# Patient Record
Sex: MALE | Race: WHITE | NOT HISPANIC OR LATINO | Employment: FULL TIME | URBAN - METROPOLITAN AREA
[De-identification: names, ages, dates, MRNs, and addresses within clinical notes are randomized per-mention and may not be internally consistent; named-entity substitution may affect disease eponyms.]

---

## 2017-07-12 ENCOUNTER — OFFICE VISIT (OUTPATIENT)
Dept: INTERNAL MEDICINE | Facility: CLINIC | Age: 22
End: 2017-07-12
Payer: COMMERCIAL

## 2017-07-12 VITALS
HEIGHT: 68 IN | TEMPERATURE: 97 F | WEIGHT: 163.56 LBS | HEART RATE: 88 BPM | SYSTOLIC BLOOD PRESSURE: 130 MMHG | OXYGEN SATURATION: 99 % | BODY MASS INDEX: 24.79 KG/M2 | DIASTOLIC BLOOD PRESSURE: 88 MMHG

## 2017-07-12 DIAGNOSIS — F90.2 ATTENTION DEFICIT HYPERACTIVITY DISORDER, COMBINED TYPE: Primary | Chronic | ICD-10-CM

## 2017-07-12 PROCEDURE — 99203 OFFICE O/P NEW LOW 30 MIN: CPT | Mod: S$GLB,,, | Performed by: FAMILY MEDICINE

## 2017-07-12 PROCEDURE — 99999 PR PBB SHADOW E&M-NEW PATIENT-LVL III: CPT | Mod: PBBFAC,,, | Performed by: FAMILY MEDICINE

## 2017-07-12 RX ORDER — DEXTROAMPHETAMINE SACCHARATE, AMPHETAMINE ASPARTATE, DEXTROAMPHETAMINE SULFATE AND AMPHETAMINE SULFATE 5; 5; 5; 5 MG/1; MG/1; MG/1; MG/1
1 TABLET ORAL 2 TIMES DAILY
Qty: 60 TABLET | Refills: 0 | Status: SHIPPED | OUTPATIENT
Start: 2017-07-12 | End: 2017-08-11

## 2017-07-12 RX ORDER — DEXTROAMPHETAMINE SACCHARATE, AMPHETAMINE ASPARTATE, DEXTROAMPHETAMINE SULFATE AND AMPHETAMINE SULFATE 5; 5; 5; 5 MG/1; MG/1; MG/1; MG/1
1 TABLET ORAL DAILY
COMMUNITY
Start: 2013-11-01 | End: 2017-07-12 | Stop reason: SDUPTHER

## 2017-07-12 RX ORDER — DEXTROAMPHETAMINE SACCHARATE, AMPHETAMINE ASPARTATE, DEXTROAMPHETAMINE SULFATE AND AMPHETAMINE SULFATE 5; 5; 5; 5 MG/1; MG/1; MG/1; MG/1
1 TABLET ORAL 2 TIMES DAILY
Qty: 60 TABLET | Refills: 0 | Status: SHIPPED | OUTPATIENT
Start: 2017-08-12 | End: 2017-09-11

## 2017-07-12 RX ORDER — DEXTROAMPHETAMINE SACCHARATE, AMPHETAMINE ASPARTATE, DEXTROAMPHETAMINE SULFATE AND AMPHETAMINE SULFATE 5; 5; 5; 5 MG/1; MG/1; MG/1; MG/1
1 TABLET ORAL 2 TIMES DAILY
Qty: 60 TABLET | Refills: 0 | Status: SHIPPED | OUTPATIENT
Start: 2017-09-12 | End: 2017-10-06 | Stop reason: SDUPTHER

## 2017-07-12 NOTE — ASSESSMENT & PLAN NOTE
He describes the QUALITY of his symptoms as primarily those of the inattention category. He reports his perception of the SEVERITY of his inattention symptoms as MODERATELY SEVERE when he does not take his medication, and MILD to MODERATE when he does take his medication. ASSOCIATED SYMPTOMS include those of the hyperactivity/impulsivity category. He reports his perception of the SEVERITY of his hyperactivity/impulsivity symptoms as MODERATE when he does not take his medication, and MINIMAL to MILD when he does take his medication.  He reports the ONSET of his symptoms was early childhood, having been diagnosed in 1st grade by his pediatritian, and starting treatment in 2nd grade. He reports that EXACERBATING FACTORS include or have included insufficient sleep. He reports that ALLEVIATING FACTORS include or have included prescription stimulant meds for ADHD (Adderall 20mg BID). He reports that these symptoms consistently occur in a variety of settings. 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 He reports no history of schizophrenia, bipolar disorder, neyda or hypomania, or substance abuse. [place] Board of Pharmacy Controlled Prescription Drug Monitoring database was queried and showed data consistent with the history he provided, and it showed no data to suggest abuse, diversion, or other inappropriate use of prescription medications. I called the physician who was treating him throughout undergraduate and up until now (Dr. Sebastián Issa, [phone]), and he confirmed the history provided by Shayne, and he were reported that he was aware of no reason that he should not continue on his current ADHD medication.    STANDARDIZED QUESTIONNAIRE SCORES:  · PHQ-15: 2  · SERENA-7: 0  · PHQ-9: 1  · MDQ: 2  · AUDIT-C: 5 (counseling provided)  · ASRS-v1.1  · Part A: 12  · Part B: 29  · TOTAL: 41

## 2017-07-12 NOTE — PROGRESS NOTES
CHIEF COMPLAINT  Establish Care  discuss ADHD treatment    HISTORY OF PRESENT ILLNESS    Problem List Items Addressed This Visit     Attention deficit hyperactivity disorder, combined type - Primary (Chronic)    Current Assessment & Plan     He describes the QUALITY of his symptoms as primarily those of the inattention category. He reports his perception of the SEVERITY of his inattention symptoms as MODERATELY SEVERE when he does not take his medication, and MILD to MODERATE when he does take his medication. ASSOCIATED SYMPTOMS include those of the hyperactivity/impulsivity category. He reports his perception of the SEVERITY of his hyperactivity/impulsivity symptoms as MODERATE when he does not take his medication, and MINIMAL to MILD when he does take his medication.  He reports the ONSET of his symptoms was early childhood, having been diagnosed in 1st grade by his pediatritian, and starting treatment in 2nd grade. He reports that EXACERBATING FACTORS include or have included insufficient sleep. He reports that ALLEVIATING FACTORS include or have included prescription stimulant meds for ADHD (Adderall 20mg BID). He reports that these symptoms consistently occur in a variety of settings. 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 He reports no history of schizophrenia, bipolar disorder, neyda or hypomania, or substance abuse. [place] Board of Pharmacy Controlled Prescription Drug Monitoring database was queried and showed data consistent with the history he provided, and it showed no data to suggest abuse, diversion, or other inappropriate use of prescription medications. I called the physician who was treating him throughout undergraduate and up until now (Dr. Sebastián Issa, [phone]), and he confirmed the history provided by Shayne, and he were reported that he was aware of no reason that he  should not continue on his current ADHD medication.    STANDARDIZED QUESTIONNAIRE SCORES:  · PHQ-15: 2  · SERENA-7: 0  · PHQ-9: 1  · MDQ: 2  · AUDIT-C: 5 (counseling provided)  · ASRS-v1.1  · Part A: 12  · Part B: 29  · TOTAL: 41         Relevant Medications    dextroamphetamine-amphetamine (ADDERALL) 20 mg tablet    dextroamphetamine-amphetamine (ADDERALL) 20 mg tablet (Start on 8/12/2017)    dextroamphetamine-amphetamine (ADDERALL) 20 mg tablet (Start on 9/12/2017)      Other Visit Diagnoses    None.       TOTAL TIME evaluating and managing this patient for this encounter exceeded 30 minutes, the majority spent counseling and coordinating care for the listed diagnoses.     REVIEW OF SYSTEMS  Answers for HPI/ROS submitted by the patient on 7/10/2017   activity change: No  unexpected weight change: No  neck pain: No  hearing loss: No  rhinorrhea: No  trouble swallowing: No  eye discharge: No  visual disturbance: No  chest tightness: No  wheezing: No  chest pain: No  palpitations: No  blood in stool: No  constipation: No  vomiting: No  diarrhea: No  polydipsia: No  polyuria: No  difficulty urinating: No  hematuria: No  joint swelling: No  arthralgias: No  headaches: No  weakness: No  confusion: No  dysphoric mood: No  (Responses reviewed and clarified/amended as clinically appropriate. -JOHANNE Retana MD)    PHYSICAL EXAM  CONSTITUTIONAL: Vital signs (BP, P, T, RR, et al) noted. No apparent distress. Does not appear acutely ill or septic. Appears adequately hydrated.  HEENT: External ENT grossly unremarkable. Hearing grossly intact. Oropharynx moist.  NECK: Neck supple without meningismus. Trachea midline.  PULM: Lungs clear. Breathing unlabored.  HEART: Auscultation reveals regular rate and rhythm without murmur, gallop or rub. No carotid bruit.  GI: Abdomen soft and nontender. Bowel sounds present.  DERM: Skin warm and moist with normal turgor.   NEURO: Strength is reasonably symmetric without gross focal  "motor deficits or gross deficits of cranial nerve III-XII.  PSYCH: Alert and oriented x 3. Mood is grossly euthymic. Affect appropriate. Judgment and insight not grossly compromised.  MSK: Grossly normal stance and gait.     HEALTH MAINTENANCE - DUE SOON OR OVERDUE  There are no preventive care reminders to display for this patient.     HEALTH MAINTENANCE - COMPLETED  The patient is not eligible for Health Maintenance     CURRENT MEDICATION LIST REVIEWED AND UPDATED  Current Outpatient Prescriptions   Medication Sig    dextroamphetamine-amphetamine (ADDERALL) 20 mg tablet Take 1 tablet by mouth 2 (two) times daily.    [START ON 8/12/2017] dextroamphetamine-amphetamine (ADDERALL) 20 mg tablet Take 1 tablet by mouth 2 (two) times daily.    [START ON 9/12/2017] dextroamphetamine-amphetamine (ADDERALL) 20 mg tablet Take 1 tablet by mouth 2 (two) times daily.     No current facility-administered medications for this visit.        ALLERGY LIST REVIEWED AND UPDATED  Review of patient's allergies indicates:  No Known Allergies    MEDICAL HISTORY REVIEWED AND UPDATED  He has a past medical history of ADHD.    SURGICAL HISTORY REVIEWED AND UPDATED  He has a past surgical history that includes [place] tooth extraction.    SOCIAL HISTORY REVIEWED AND UPDATED  He reports that he has been smoking Cigarettes.  He started smoking about 4 weeks ago. He has been smoking about 0.10 packs per day. He has never used smokeless tobacco. He reports that he drinks alcohol. He reports that he does not use drugs.    ASSESSMENT and PLAN  NOTE: Unless specified otherwise, the PLAN for a listed ASSESSMENT is to continue present treatment as prescribed. The planned follow-up and additional "Patient Instructions" may also be found in the After Visit Summary printed and provided to the patient.    Attention deficit hyperactivity disorder, combined type  -     dextroamphetamine-amphetamine (ADDERALL) 20 mg tablet; Take 1 tablet by mouth 2 (two) " times daily.  Dispense: 60 tablet; Refill: 0  -     dextroamphetamine-amphetamine (ADDERALL) 20 mg tablet; Take 1 tablet by mouth 2 (two) times daily.  Dispense: 60 tablet; Refill: 0  -     dextroamphetamine-amphetamine (ADDERALL) 20 mg tablet; Take 1 tablet by mouth 2 (two) times daily.  Dispense: 60 tablet; Refill: 0      Medication List with Changes/Refills   New Medications    DEXTROAMPHETAMINE-AMPHETAMINE (ADDERALL) 20 MG TABLET    Take 1 tablet by mouth 2 (two) times daily.    DEXTROAMPHETAMINE-AMPHETAMINE (ADDERALL) 20 MG TABLET    Take 1 tablet by mouth 2 (two) times daily.   Changed and/or Refilled Medications    Modified Medication Previous Medication    DEXTROAMPHETAMINE-AMPHETAMINE (ADDERALL) 20 MG TABLET dextroamphetamine-amphetamine (ADDERALL) 20 mg tablet       Take 1 tablet by mouth 2 (two) times daily.    Take 1 tablet by mouth Daily.        Return in about 3 months (around 10/12/2017) for re-evaluate problem(s) discussed today.    ABOUT THIS DOCUMENTATION:  1. The order of the conditions listed in the HPI is one of convenience and does not necessarily reflect the chronology of the appointment, nor the relative importance of a condition. It is possible that additional description or status details about condition(s) may be found elsewhere in the documentation for today's encounter.  2. Documentation entered by me for this encounter was done in part using speech-recognition technology. Although I have made an effort to ensure accuracy, malapropisms may exist and should be interpreted in context.                        SKIP Retana MD

## 2017-10-06 ENCOUNTER — OFFICE VISIT (OUTPATIENT)
Dept: INTERNAL MEDICINE | Facility: CLINIC | Age: 22
End: 2017-10-06
Payer: COMMERCIAL

## 2017-10-06 VITALS
WEIGHT: 165.38 LBS | TEMPERATURE: 97 F | HEIGHT: 68 IN | SYSTOLIC BLOOD PRESSURE: 118 MMHG | HEART RATE: 65 BPM | BODY MASS INDEX: 25.07 KG/M2 | OXYGEN SATURATION: 99 % | DIASTOLIC BLOOD PRESSURE: 82 MMHG

## 2017-10-06 DIAGNOSIS — F90.2 ATTENTION DEFICIT HYPERACTIVITY DISORDER, COMBINED TYPE: Chronic | ICD-10-CM

## 2017-10-06 PROCEDURE — 99999 PR PBB SHADOW E&M-EST. PATIENT-LVL III: CPT | Mod: PBBFAC,,, | Performed by: FAMILY MEDICINE

## 2017-10-06 PROCEDURE — 99214 OFFICE O/P EST MOD 30 MIN: CPT | Mod: S$GLB,,, | Performed by: FAMILY MEDICINE

## 2017-10-06 RX ORDER — DEXTROAMPHETAMINE SACCHARATE, AMPHETAMINE ASPARTATE, DEXTROAMPHETAMINE SULFATE AND AMPHETAMINE SULFATE 5; 5; 5; 5 MG/1; MG/1; MG/1; MG/1
1 TABLET ORAL 2 TIMES DAILY
Qty: 60 TABLET | Refills: 0 | Status: CANCELLED | OUTPATIENT
Start: 2017-11-06 | End: 2017-12-07

## 2017-10-06 RX ORDER — DEXTROAMPHETAMINE SACCHARATE, AMPHETAMINE ASPARTATE, DEXTROAMPHETAMINE SULFATE AND AMPHETAMINE SULFATE 5; 5; 5; 5 MG/1; MG/1; MG/1; MG/1
1 TABLET ORAL
Qty: 90 TABLET | Refills: 0 | Status: SHIPPED | OUTPATIENT
Start: 2017-10-06 | End: 2017-10-31 | Stop reason: SDUPTHER

## 2017-10-06 RX ORDER — DEXTROAMPHETAMINE SACCHARATE, AMPHETAMINE ASPARTATE, DEXTROAMPHETAMINE SULFATE AND AMPHETAMINE SULFATE 5; 5; 5; 5 MG/1; MG/1; MG/1; MG/1
1 TABLET ORAL 2 TIMES DAILY
Qty: 60 TABLET | Refills: 0 | Status: CANCELLED | OUTPATIENT
Start: 2017-12-06 | End: 2018-01-06

## 2017-10-23 NOTE — PROGRESS NOTES
HEALTH MAINTENANCE REVIEW  Health Maintenance   Topic Date Due    Lipid Panel  1993    TETANUS VACCINE  08/02/2011    Pneumococcal PPSV23 (Medium Risk) (1) 08/02/2011    Influenza Vaccine  08/01/2017        HEALTH MAINTENANCE INTERVENTIONS - DUE OR DUE SOON  Health Maintenance Due   Topic Date Due    Lipid Panel  1993    TETANUS VACCINE  08/02/2011    Pneumococcal PPSV23 (Medium Risk) (1) 08/02/2011    Influenza Vaccine  08/01/2017       FUTURE APPOINTMENTS  No future appointments.    CHIEF COMPLAINT  Medication Refill      HISTORY OF PRESENT ILLNESS  PROBLEM/CONDITION: He returns for reevaluation of his attention deficit disorder. Please see his last progress note for additional details about his attention deficit disorder. He is in the PhD math program, and he reports that he is doing satisfactorily academically. However, having been on his current dose of Adderall 20 mg twice daily since his sophomore year of college, over the last year he has found the degree and duration of therapeutic effect waning. We discussed how tolerance for this type of medication develops. I encouraged him to make use of drug holidays whenever possible. We discussed risks and benefits of treatment options. It was agreed to begin a therapeutic trial of a higher dose of Adderall, and to increase the flexibility of his dosing, I am ordering 20 mg, maximum of 3 tablets per day, to be divided at least 4 hours apart between doses and with no dose exceeding 30 mg.    No other complaints or concerns reported.    Problem List Items Addressed This Visit     Attention deficit hyperactivity disorder, combined type (Chronic)    Relevant Medications    dextroamphetamine-amphetamine (ADDERALL) 20 mg tablet          REVIEW OF SYSTEMS  PSYCHIATRIC: No suicidal ideations, mood instability, neyda or hypomania reported.  NEUROLOGIC: No seizures or disordered movement reported.  ENDOCRINE: No polyuria or polydipsia reported.  "    PHYSICAL EXAM  Vitals:    10/06/17 1544   BP: 118/82   BP Location: Right arm   Patient Position: Sitting   BP Method: Medium (Manual)   Pulse: 65   Temp: 97.3 °F (36.3 °C)   TempSrc: Tympanic   SpO2: 99%   Weight: 75 kg (165 lb 5.5 oz)   Height: 5' 8" (1.727 m)     CONSTITUTIONAL: Vital signs noted. No apparent distress. Does not appear acutely ill or septic. Appears adequately hydrated.  PULM: Lungs clear. Breathing unlabored.  CV: Heart regular.  GI: Abdomen soft and nontender.  DERM: Warm and moist.  PSYCHIATRIC: Alert and oriented x 3. Mood is grossly neutral. Affect appropriate. Judgment and insight not grossly compromised.     PAST MEDICAL HISTORY, FAMILY HISTORY, SOCIAL HISTORY, CURRENT MEDICATION LIST, and ALLERGY LIST reviewed by me (JOHANNE Retana MD) and are updated consistent with the patient's report.    ASSESSMENT and PLAN  Attention deficit hyperactivity disorder, combined type  -     dextroamphetamine-amphetamine (ADDERALL) 20 mg tablet; Take 1 tablet by mouth every 4 to 6 hours as needed (for ADD - MAX 3 TABLET PER DAY).  Dispense: 90 tablet; Refill: 0    Other orders  -     Cancel: dextroamphetamine-amphetamine (ADDERALL) 20 mg tablet; Take 1 tablet by mouth 2 (two) times daily.  Dispense: 60 tablet; Refill: 0  -     Cancel: dextroamphetamine-amphetamine (ADDERALL) 20 mg tablet; Take 1 tablet by mouth 2 (two) times daily.  Dispense: 60 tablet; Refill: 0        Medication List with Changes/Refills   Changed and/or Refilled Medications    Modified Medication Previous Medication    DEXTROAMPHETAMINE-AMPHETAMINE (ADDERALL) 20 MG TABLET dextroamphetamine-amphetamine (ADDERALL) 20 mg tablet       Take 1 tablet by mouth every 4 to 6 hours as needed (for ADD - MAX 3 TABLET PER DAY).    Take 1 tablet by mouth 2 (two) times daily.       Return in about 3 months (around 1/6/2018) for re-evaluate problem(s) discussed today.    ABOUT THIS DOCUMENTATION:  · The order of the conditions listed in " "the HPI is one of convenience and does not necessarily reflect the chronology of the appointment, nor the relative importance of a condition. It is possible that additional description or status details about condition(s) may be found elsewhere in the documentation for today's encounter.  · Documentation entered by me for this encounter was done in part using speech-recognition technology. Although I have made an effort to ensure accuracy, "sound like" errors may exist and should be interpreted in context.                        -JOHANNE Retana MD    There are no Patient Instructions on file for this visit.    Answers for HPI/ROS submitted by the patient on 10/2/2017   activity change: No  unexpected weight change: No  neck pain: No  hearing loss: No  rhinorrhea: No  trouble swallowing: No  eye discharge: No  visual disturbance: No  chest tightness: No  wheezing: No  chest pain: No  palpitations: No  blood in stool: No  constipation: No  vomiting: No  diarrhea: No  polydipsia: No  polyuria: No  difficulty urinating: No  urgency: No  hematuria: No  joint swelling: No  arthralgias: No  headaches: No  weakness: No  confusion: No  dysphoric mood: No    "

## 2017-10-26 ENCOUNTER — PATIENT MESSAGE (OUTPATIENT)
Dept: INTERNAL MEDICINE | Facility: CLINIC | Age: 22
End: 2017-10-26

## 2017-10-30 DIAGNOSIS — F90.2 ATTENTION DEFICIT HYPERACTIVITY DISORDER, COMBINED TYPE: Primary | Chronic | ICD-10-CM

## 2017-10-31 RX ORDER — DEXTROAMPHETAMINE SACCHARATE, AMPHETAMINE ASPARTATE, DEXTROAMPHETAMINE SULFATE AND AMPHETAMINE SULFATE 5; 5; 5; 5 MG/1; MG/1; MG/1; MG/1
1 TABLET ORAL
Qty: 90 TABLET | Refills: 0 | Status: CANCELLED | OUTPATIENT
Start: 2017-10-31 | End: 2017-12-01

## 2017-10-31 RX ORDER — DEXTROAMPHETAMINE SACCHARATE, AMPHETAMINE ASPARTATE MONOHYDRATE, DEXTROAMPHETAMINE SULFATE AND AMPHETAMINE SULFATE 5; 5; 5; 5 MG/1; MG/1; MG/1; MG/1
20 CAPSULE, EXTENDED RELEASE ORAL 3 TIMES DAILY
Qty: 90 CAPSULE | Refills: 0 | Status: CANCELLED | OUTPATIENT
Start: 2017-11-30 | End: 2017-12-30

## 2017-10-31 RX ORDER — DEXTROAMPHETAMINE SACCHARATE, AMPHETAMINE ASPARTATE, DEXTROAMPHETAMINE SULFATE AND AMPHETAMINE SULFATE 5; 5; 5; 5 MG/1; MG/1; MG/1; MG/1
1 TABLET ORAL
Qty: 90 TABLET | Refills: 0 | Status: SHIPPED | OUTPATIENT
Start: 2017-10-31 | End: 2017-12-27 | Stop reason: SDUPTHER

## 2017-10-31 RX ORDER — DEXTROAMPHETAMINE SACCHARATE, AMPHETAMINE ASPARTATE, DEXTROAMPHETAMINE SULFATE AND AMPHETAMINE SULFATE 5; 5; 5; 5 MG/1; MG/1; MG/1; MG/1
1 TABLET ORAL
Qty: 90 TABLET | Refills: 0 | Status: SHIPPED | OUTPATIENT
Start: 2017-11-30 | End: 2017-12-27 | Stop reason: SDUPTHER

## 2017-10-31 NOTE — TELEPHONE ENCOUNTER
Shayne Guillermo.    I have received and approved your refill request as follows:  Attention deficit hyperactivity disorder, combined type  -     dextroamphetamine-amphetamine (ADDERALL) 20 mg tablet; Take 1 tablet by mouth every 4 to 6 hours as needed (for ADD - MAX 3 TABLET PER DAY).  Dispense: 90 tablet; Refill: 0  -     dextroamphetamine-amphetamine (ADDERALL) 20 mg tablet; Take 1 tablet by mouth every 4 to 6 hours as needed (for ADD - MAX 3 TABLET PER DAY).  Dispense: 90 tablet; Refill: 0    Other orders  -     Cancel: dextroamphetamine-amphetamine (ADDERALL) 20 mg tablet; Take 1 tablet by mouth every 4 to 6 hours as needed (for ADD - MAX 3 TABLET PER DAY).  Dispense: 90 tablet; Refill: 0  -     Cancel: dextroamphetamine-amphetamine (ADDERALL XR) 20 MG 24 hr capsule; Take 1 capsule (20 mg total) by mouth 3 (three) times daily.  Dispense: 90 capsule; Refill: 0        I sent the prescription electronically to the pharmacy you have listed as your preferred pharmacy:    [facility] [address] [place] [address] & [address] [address]  Phone: [phone] Fax: [phone]      NOTE: If you want to fill the prescription at a different pharmacy, tell your pharmacist to contact the other pharmacy to have the prescription transferred.    **Be sure to come in for re-evaluation before you need any more refills.**    Thanks for letting me care for you.    Kind regards,    JOHANNE Retana M.D.    TIP: It is a good idea to take your current prescription bottles with you to your doctor appointments so wecan check for any other refills you may need.    ----------------------------------------------------------------  UPCOMING APPOINTMENTS  ----------------------------------------------------------------  No future appointments.     ----------------------------------------------------------------  TO SCHEDULE OR CHANGE AN  APPOINTMENT  ----------------------------------------------------------------  *Login to your [facility] account at [email or web address]  *Use the [facility] ramon on your mobile device   or  *Call our appointment desk at [phone].    ----------------------------------------------------------------  ADDITIONAL IMPORTANT INFORMATION  ----------------------------------------------------------------  *[facility] is NOT to be used for urgent needs.  *Although we try to respond to messages within 2 business days, a response can take longer, depending on circumstances.  *For medical emergencies, dial 911.    You can get help with [facility] and [facility] by email at  [email or web address] or by phone at [phone]. The [facility] Patient Support Team is available Monday through Friday from 9 a.m. until 5 p.m.  (After hours, you can leave a message requesting assistance.)

## 2017-12-27 ENCOUNTER — OFFICE VISIT (OUTPATIENT)
Dept: INTERNAL MEDICINE | Facility: CLINIC | Age: 22
End: 2017-12-27
Payer: COMMERCIAL

## 2017-12-27 VITALS
TEMPERATURE: 98 F | SYSTOLIC BLOOD PRESSURE: 124 MMHG | WEIGHT: 171.94 LBS | BODY MASS INDEX: 26.06 KG/M2 | DIASTOLIC BLOOD PRESSURE: 70 MMHG | HEIGHT: 68 IN | RESPIRATION RATE: 18 BRPM | HEART RATE: 92 BPM

## 2017-12-27 DIAGNOSIS — F90.2 ATTENTION DEFICIT HYPERACTIVITY DISORDER, COMBINED TYPE: Chronic | ICD-10-CM

## 2017-12-27 PROCEDURE — 99999 PR PBB SHADOW E&M-EST. PATIENT-LVL III: CPT | Mod: PBBFAC,,, | Performed by: FAMILY MEDICINE

## 2017-12-27 PROCEDURE — 99213 OFFICE O/P EST LOW 20 MIN: CPT | Mod: S$GLB,,, | Performed by: FAMILY MEDICINE

## 2017-12-27 RX ORDER — DEXTROAMPHETAMINE SACCHARATE, AMPHETAMINE ASPARTATE, DEXTROAMPHETAMINE SULFATE AND AMPHETAMINE SULFATE 5; 5; 5; 5 MG/1; MG/1; MG/1; MG/1
1 TABLET ORAL
Qty: 90 TABLET | Refills: 0 | Status: SHIPPED | OUTPATIENT
Start: 2018-02-27 | End: 2018-03-19 | Stop reason: SDUPTHER

## 2017-12-27 RX ORDER — DEXTROAMPHETAMINE SACCHARATE, AMPHETAMINE ASPARTATE, DEXTROAMPHETAMINE SULFATE AND AMPHETAMINE SULFATE 5; 5; 5; 5 MG/1; MG/1; MG/1; MG/1
1 TABLET ORAL
Qty: 90 TABLET | Refills: 0 | Status: SHIPPED | OUTPATIENT
Start: 2017-12-27 | End: 2018-03-19 | Stop reason: SDUPTHER

## 2017-12-27 RX ORDER — DEXTROAMPHETAMINE SACCHARATE, AMPHETAMINE ASPARTATE, DEXTROAMPHETAMINE SULFATE AND AMPHETAMINE SULFATE 5; 5; 5; 5 MG/1; MG/1; MG/1; MG/1
1 TABLET ORAL
Qty: 90 TABLET | Refills: 0 | Status: SHIPPED | OUTPATIENT
Start: 2018-01-27 | End: 2018-03-19 | Stop reason: SDUPTHER

## 2018-01-03 NOTE — PROGRESS NOTES
HEALTH MAINTENANCE REVIEW  Health Maintenance   Topic Date Due    Lipid Panel  1993    TETANUS VACCINE  08/02/2011    Pneumococcal PPSV23 (Medium Risk) (1) 08/02/2011    Influenza Vaccine  08/01/2017        HEALTH MAINTENANCE INTERVENTIONS - DUE OR DUE SOON  Health Maintenance Due   Topic Date Due    Lipid Panel  1993    TETANUS VACCINE  08/02/2011    Pneumococcal PPSV23 (Medium Risk) (1) 08/02/2011    Influenza Vaccine  08/01/2017       FUTURE APPOINTMENTS  No future appointments.    CHIEF COMPLAINT  Medication Refill (adderall)      HISTORY OF PRESENT ILLNESS  PROBLEM/CONDITION: Attention deficit disorder appears well-controlled. No mood instability reported. I have observed no behaviors to suggest inappropriate use of prescribed medications. [place] Board of Pharmacy Controlled Prescription Drug Monitoring database was queried and showed no activity to suggest abuse, diversion, or other inappropriate use of prescription medications.    No other complaints or concerns reported.    Problem List Items Addressed This Visit     Attention deficit hyperactivity disorder, combined type (Chronic)    Relevant Medications    dextroamphetamine-amphetamine (ADDERALL) 20 mg tablet    dextroamphetamine-amphetamine (ADDERALL) 20 mg tablet (Start on 1/27/2018)    dextroamphetamine-amphetamine (ADDERALL) 20 mg tablet (Start on 2/27/2018)          REVIEW OF SYSTEMS  PSYCHIATRIC: No mood instability reported.  NEUROLOGIC: No seizures or tics reported.   Answers for HPI/ROS submitted by the patient on 12/27/2017   activity change: No  unexpected weight change: No  neck pain: No  hearing loss: No  rhinorrhea: No  trouble swallowing: No  eye discharge: No  visual disturbance: No  chest tightness: No  wheezing: No  chest pain: No  palpitations: No  blood in stool: No  constipation: No  vomiting: No  diarrhea: No  polydipsia: No  polyuria: No  difficulty urinating: No  urgency: No  hematuria: No  joint swelling:  "No  arthralgias: No  headaches: No  weakness: No  confusion: No  dysphoric mood: No    PHYSICAL EXAM  Vitals:    12/27/17 1523   BP: 124/70   BP Location: Right arm   Patient Position: Sitting   Pulse: 92   Resp: 18   Temp: 97.8 °F (36.6 °C)   TempSrc: Tympanic   Weight: 78 kg (171 lb 15.3 oz)   Height: 5' 8" (1.727 m)     CONST: Vital signs noted. No apparent distress. Appears well.  PULM: Lungs clear. Breathing unlabored.  CV: Heart regular.  DERM: Warm and moist. Normal turgor.  PSYCH: Alert and oriented x 3. Mood is grossly euthymic. Affect appropriate. Judgment and insight not grossly compromised.     PAST MEDICAL HISTORY, FAMILY HISTORY, SOCIAL HISTORY, CURRENT MEDICATION LIST, and ALLERGY LIST reviewed by me (JOHANNE Retana MD) and are updated consistent with the patient's report.    ASSESSMENT and PLAN  Attention deficit hyperactivity disorder, combined type  -     dextroamphetamine-amphetamine (ADDERALL) 20 mg tablet; Take 1 tablet by mouth every 4 to 6 hours as needed (for ADD - MAX 3 TABLET PER DAY).  Dispense: 90 tablet; Refill: 0  -     dextroamphetamine-amphetamine (ADDERALL) 20 mg tablet; Take 1 tablet by mouth every 4 to 6 hours as needed (for ADD - MAX 3 TABLET PER DAY).  Dispense: 90 tablet; Refill: 0  -     dextroamphetamine-amphetamine (ADDERALL) 20 mg tablet; Take 1 tablet by mouth every 4 to 6 hours as needed (for ADD - MAX 3 TABLET PER DAY).  Dispense: 90 tablet; Refill: 0        PRESCRIPTION MEDICATION MANAGEMENT  Medication List with Changes/Refills   New Medications    DEXTROAMPHETAMINE-AMPHETAMINE (ADDERALL) 20 MG TABLET    Take 1 tablet by mouth every 4 to 6 hours as needed (for ADD - MAX 3 TABLET PER DAY).    DEXTROAMPHETAMINE-AMPHETAMINE (ADDERALL) 20 MG TABLET    Take 1 tablet by mouth every 4 to 6 hours as needed (for ADD - MAX 3 TABLET PER DAY).   Changed and/or Refilled Medications    Modified Medication Previous Medication    DEXTROAMPHETAMINE-AMPHETAMINE (ADDERALL) 20 MG " "TABLET dextroamphetamine-amphetamine (ADDERALL) 20 mg tablet       Take 1 tablet by mouth every 4 to 6 hours as needed (for ADD - MAX 3 TABLET PER DAY).    Take 1 tablet by mouth every 4 to 6 hours as needed (for ADD - MAX 3 TABLET PER DAY).   Discontinued Medications    DEXTROAMPHETAMINE-AMPHETAMINE (ADDERALL) 20 MG TABLET    Take 1 tablet by mouth every 4 to 6 hours as needed (for ADD - MAX 3 TABLET PER DAY).       Return in about 3 months (around 3/27/2018) for re-evaluate problem(s) discussed today.    ABOUT THIS DOCUMENTATION:  · The order of the conditions listed in the HPI is one of convenience and does not necessarily reflect the chronology of the appointment, nor the relative importance of a condition. It is possible that additional description or status details about condition(s) may be found elsewhere in the EHR documentation for today's encounter.  · Documentation entered by me for this encounter was done in part using speech-recognition technology. Although I have made an effort to ensure accuracy, "sound like" errors may exist and should be interpreted in context.                        -JOHANNE Retana MD    There are no Patient Instructions on file for this visit.    "

## 2018-03-19 ENCOUNTER — OFFICE VISIT (OUTPATIENT)
Dept: INTERNAL MEDICINE | Facility: CLINIC | Age: 23
End: 2018-03-19
Payer: COMMERCIAL

## 2018-03-19 VITALS
WEIGHT: 175.94 LBS | SYSTOLIC BLOOD PRESSURE: 124 MMHG | HEIGHT: 68 IN | HEART RATE: 88 BPM | BODY MASS INDEX: 26.66 KG/M2 | TEMPERATURE: 97 F | DIASTOLIC BLOOD PRESSURE: 84 MMHG | OXYGEN SATURATION: 99 %

## 2018-03-19 DIAGNOSIS — K21.9 GASTROESOPHAGEAL REFLUX DISEASE WITHOUT ESOPHAGITIS: Primary | Chronic | ICD-10-CM

## 2018-03-19 DIAGNOSIS — F90.2 ATTENTION DEFICIT HYPERACTIVITY DISORDER, COMBINED TYPE: Chronic | ICD-10-CM

## 2018-03-19 PROCEDURE — 99999 PR PBB SHADOW E&M-EST. PATIENT-LVL III: CPT | Mod: PBBFAC,,, | Performed by: FAMILY MEDICINE

## 2018-03-19 PROCEDURE — 3008F BODY MASS INDEX DOCD: CPT | Mod: CPTII,S$GLB,, | Performed by: FAMILY MEDICINE

## 2018-03-19 PROCEDURE — 99213 OFFICE O/P EST LOW 20 MIN: CPT | Mod: S$GLB,,, | Performed by: FAMILY MEDICINE

## 2018-03-19 RX ORDER — DEXTROAMPHETAMINE SACCHARATE, AMPHETAMINE ASPARTATE, DEXTROAMPHETAMINE SULFATE AND AMPHETAMINE SULFATE 5; 5; 5; 5 MG/1; MG/1; MG/1; MG/1
1 TABLET ORAL
Qty: 90 TABLET | Refills: 0 | Status: SHIPPED | OUTPATIENT
Start: 2018-03-19 | End: 2018-06-11 | Stop reason: SDUPTHER

## 2018-03-19 RX ORDER — FAMOTIDINE 40 MG/1
40 TABLET, FILM COATED ORAL DAILY PRN
Qty: 90 TABLET | Refills: 3 | Status: SHIPPED | OUTPATIENT
Start: 2018-03-19 | End: 2019-07-31

## 2018-03-19 NOTE — PATIENT INSTRUCTIONS
"  GERD (Adult)    The esophagus is a tube that carries food from the mouth to the stomach. A valve at the lower end of the esophagus prevents stomach acid from flowing upward. When this valve doesn't work properly, stomach contents may repeatedly flow back up (reflux) into the esophagus. This is called gastroesophageal reflux disease (GERD). GERD can irritate the esophagus. It can cause problems with swallowing or breathing. In severe cases, GERD can cause recurrent pneumonia or other serious problems.  Symptoms of reflux include burning, pressure or sharp pain in the upper abdomen or mid to lower chest. The pain can spread to the neck, back, or shoulder. There may be belching, an acid taste in the back of the throat, chronic cough, or sore throat or hoarseness. GERD symptoms often occur during the day after a big meal. They can also occur at night when lying down.   Home care  Lifestyle changes can help reduce symptoms. If needed, medicines may be prescribed. Symptoms often improve with treatment, but if treatment is stopped, the symptoms often return after a few months. So most persons with GERD will need to continue treatment.  Lifestyle changes  · Limit or avoid fatty, fried, and spicy foods, as well as coffee, chocolate, mint, and foods with high acid content such as tomatoes and citrus fruit and juices (orange, grapefruit, lemon).  · Dont eat large meals, especially at night. Frequent, smaller meals are best. Do not lie down right after eating. And dont eat anything 3 hours before going to bed.  · Avoid drinking alcohol and smoking. As much as possible, stay away from second hand smoke.  · If you are overweight, losing weight will reduce symptoms.   · Avoid wearing tight clothing around your stomach area.  · If your symptoms occur during sleep, use a foam wedge to elevate your upper body (not just your head.) Or, place 4" blocks under the head of your bed.  Medicines  If needed, medicines can help relieve " the symptoms of GERD and prevent damage to the esophagus. Discuss a medicine plan with your healthcare provider. This may include one or more of the following medicines:  · Antacids to help neutralize the normal acids in your stomach.  · Acid blockers (H2 blockers) to decrease acid production.  · Acid inhibitors (PPIs) to decrease acid production in a different way than the blockers. They may work better, but can take a little longer to take effect.  Take an antacid 30-60 minutes after eating and at bedtime, but not at the same time as an acid blocker.  Try not to take medicines such as ibuprofen and aspirin. If you are taking aspirin for your heart or other medical reasons, talk to your healthcare provider about stopping it.  Follow-up care  Follow up with your healthcare provider or as advised by our staff.  When to seek medical advice  Call your healthcare provider if any of the following occur:  · Stomach pain gets worse or moves to the lower right abdomen (appendix area)  · Chest pain appears or gets worse, or spreads to the back, neck, shoulder, or arm  · Frequent vomiting (cant keep down liquids)  · Blood in the stool or vomit (red or black in color)  · Feeling weak or dizzy  · Fever of 100.4ºF (38ºC) or higher, or as directed by your healthcare provider  Date Last Reviewed: 6/23/2015 © 2000-2017 [employer]. [address] [place] [address], [place] [address]. All rights reserved. This information is not intended as a substitute for professional medical care. Always follow your healthcare professional's instructions.        Medicines for GERD  Gastroesophageal reflux disease (GERD) can be treated with medicine. This may be done with a medicine you can buy over the counter. Or it may be done with a medicine that your healthcare provider has to prescribe. In some cases, both types may be used. Your provider will tell you what is best for your symptoms.  Antacids  Antacids work to weaken the acid in your  stomach and can give you quick relief. You can buy many of them with no prescription. Antacids can be high in sodium. This may be a problem if you have high blood pressure. Some antacids also have aluminum. This should be avoided if you have long-term (chronic) kidney disease. So check with your provider first. Take antacids only when you need to, as advised by your provider.  Side effects: Constipation, diarrhea. If you take too much medicine, it can cause calcium to build up.   H-2 blockers  H-2 blockers cause the stomach to make less acid. They are often used both on demand as symptoms occur, and daily to keep symptoms away. Your provider may prescribe them if antacids dont work for you. You can buy some of them over the counter. These come in a lower dosage.  Side effects: Confusion in older adults  Proton-pump inhibitors  These also cause the stomach to make less acid. They reduce stomach acid more than H-2 blockers. They may be used for a short time, or longer to treat certain conditions. You can buy some of them over the counter. Or your provider may prescribe them. They help control GERD symptoms.  Side effects: Belly (abdominal) pain, diarrhea, upset stomach (nausea). Possible other side effects linked to long-term use and high doses.  Prokinetics  These medicines affect the movement of the digestive tract. They may be recommended if your stomach is emptying too slowly. But in most cases they are not recommended for treating GERD.   Side effects: Tiredness, depression, anxiety, problems with physical movement, belly cramps, constipation, diarrhea, a jittery feeling  Medicines to avoid  Dont take aspirin without your providers approval. And dont take a nonsteroidal anti-inflammatory drug (NSAID), such as ibuprofen. These reduce the protective lining of your stomach. This can lead to more GERD symptoms. Check with your provider or pharmacist before taking a new medicine.   Date Last Reviewed: 7/1/2016  ©  3323-8952 [employer]. [address] [place] [address], [place] [address]. All rights reserved. This information is not intended as a substitute for professional medical care. Always follow your healthcare professional's instructions.

## 2018-06-07 NOTE — ASSESSMENT & PLAN NOTE
He reports that his current treatment is providing satisfactory relief of his attention deficit disorder symptoms and helping him compensate for his deficits. He reports that he is tolerating his current treatment well. he reports no mood instability, tics, or disordered sleep, or other apparent adverse effects. He is demonstrating no behaviors to suggest inappropriate use of prescribed medications. [place] [employer]acy Controlled Prescription Drug Monitoring database was queried and showed no activity to suggest abuse, diversion, or other inappropriate use of prescription medications.

## 2018-06-07 NOTE — PROGRESS NOTES
CHIEF COMPLAINT  Medication Refill      HISTORY OF PRESENT ILLNESS    Problem List Items Addressed This Visit        Psychiatric    Attention deficit hyperactivity disorder, combined type (Chronic)    Current Assessment & Plan     He reports that his current treatment is providing satisfactory relief of his attention deficit disorder symptoms and helping him compensate for his deficits. He reports that he is tolerating his current treatment well. he reports no mood instability, tics, or disordered sleep, or other apparent adverse effects. He is demonstrating no behaviors to suggest inappropriate use of prescribed medications. [place] [employer]acy Controlled Prescription Drug Monitoring database was queried and showed no activity to suggest abuse, diversion, or other inappropriate use of prescription medications.          Relevant Medications    dextroamphetamine-amphetamine (ADDERALL) 20 mg tablet    dextroamphetamine-amphetamine (ADDERALL) 20 mg tablet    dextroamphetamine-amphetamine (ADDERALL) 20 mg tablet       GI    Gastroesophageal reflux disease without esophagitis - Primary (Chronic)    Current Assessment & Plan     He reports typical GERD symptoms as follows. QUALITY described as burning dyspepsia. LOCATION reported as epigastric with radiation upwards. SEVERITY described as moderate. EXACERBATING FACTORS include recumbent positioning and certain foods. ALLEVIATING FACTORS include OTC antacids, which provide transient symptom relief. He reports no vomiting, blood in stool, or dark tarry stool.          Relevant Medications    famotidine (PEPCID) 40 MG tablet          PAST MEDICAL HISTORY, FAMILY HISTORY and SOCIAL HISTORY, CURRENT MEDICATION LIST, and ALLERGY LIST reviewed by me (JOHANNE Retana MD) and are updated consistent with the patient's report.    REVIEW OF SYSTEMS  PSYCHIATRIC: No mood instability reported.   GASTROINTESTINAL: No hematemesis or melena reported. No vomiting or diarrhea  "reported.       PHYSICAL EXAM  Vitals:    03/19/18 1423   BP: 124/84   BP Location: Right arm   Patient Position: Sitting   BP Method: Medium (Manual)   Pulse: 88   Temp: 97.4 °F (36.3 °C)   TempSrc: Tympanic   SpO2: 99%   Weight: 79.8 kg (175 lb 14.8 oz)   Height: 5' 8" (1.727 m)     CONSTITUTIONAL: Vital signs noted. No apparent distress. Does not appear acutely ill or septic. Appears adequately hydrated.  EYE: Sclerae anicteric. Lids and conjunctiva unremarkable.  ENT: External ENT unremarkable. Oropharynx moist.  NECK: Trachea midline. Thyroid nontender.  PULM: Lungs clear. Breathing unlabored.  CV: Auscultation reveals regular rate and rhythm without murmur, gallop or rub. No carotid bruit.  GI: Soft and nontender. Bowel sounds normal.  DERM: Skin warm and moist with normal turgor.  NEURO: There are no gross focal motor deficits or gross deficits of cranial nerves III-XII.  PSYCH: Alert and oriented x 3. Mood is grossly neutral. Affect appropriate. Judgment and insight not grossly compromised.  MSK: Grossly normal stance and gait.     ASSESSMENT and PLAN  Gastroesophageal reflux disease without esophagitis  -     famotidine (PEPCID) 40 MG tablet; Take 1 tablet (40 mg total) by mouth daily as needed for Heartburn.  Dispense: 90 tablet; Refill: 3    Attention deficit hyperactivity disorder, combined type  -     dextroamphetamine-amphetamine (ADDERALL) 20 mg tablet; Take 1 tablet by mouth every 4 to 6 hours as needed (for ADD - MAX 3 TABLET PER DAY).  Dispense: 90 tablet; Refill: 0  -     dextroamphetamine-amphetamine (ADDERALL) 20 mg tablet; Take 1 tablet by mouth every 4 to 6 hours as needed (for ADD - MAX 3 TABLET PER DAY).  Dispense: 90 tablet; Refill: 0  -     dextroamphetamine-amphetamine (ADDERALL) 20 mg tablet; Take 1 tablet by mouth every 4 to 6 hours as needed (for ADD - MAX 3 TABLET PER DAY).  Dispense: 90 tablet; Refill: 0        PRESCRIPTION MEDICATION MANAGEMENT  Except as noted below, CURRENT " "MEDICATIONS are to remain unchanged from that listed above.    Medications Ordered This Encounter      dextroamphetamine-amphetamine (ADDERALL) 20 mg tablet          Sig: Take 1 tablet by mouth every 4 to 6 hours as needed (for ADD - MAX 3 TABLET PER DAY).          Dispense:  90 tablet          Refill:  0      dextroamphetamine-amphetamine (ADDERALL) 20 mg tablet          Sig: Take 1 tablet by mouth every 4 to 6 hours as needed (for ADD - MAX 3 TABLET PER DAY).          Dispense:  90 tablet          Refill:  0      dextroamphetamine-amphetamine (ADDERALL) 20 mg tablet          Sig: Take 1 tablet by mouth every 4 to 6 hours as needed (for ADD - MAX 3 TABLET PER DAY).          Dispense:  90 tablet          Refill:  0      famotidine (PEPCID) 40 MG tablet          Sig: Take 1 tablet (40 mg total) by mouth daily as needed for Heartburn.          Dispense:  90 tablet          Refill:  3  Medications Discontinued During This Encounter   Medication Reason    dextroamphetamine-amphetamine (ADDERALL) 20 mg tablet Reorder    dextroamphetamine-amphetamine (ADDERALL) 20 mg tablet Reorder    dextroamphetamine-amphetamine (ADDERALL) 20 mg tablet Reorder       Follow-up for any new complaints or concerns.    ABOUT THIS DOCUMENTATION:  · The order of the conditions listed in the HPI is one of convenience and does not necessarily reflect the chronology of the appointment, nor the relative importance of a condition. It is possible that additional description or status details about condition(s) may be found elsewhere in the EHR documentation for today's encounter.  · Documentation entered by me for this encounter was done in part using speech-recognition technology. Although I have made an effort to ensure accuracy, "sound like" errors may exist and should be interpreted in context.                        -JOHANNE Retana MD    Patient Instructions       GERD (Adult)    The esophagus is a tube that carries food from the mouth " "to the stomach. A valve at the lower end of the esophagus prevents stomach acid from flowing upward. When this valve doesn't work properly, stomach contents may repeatedly flow back up (reflux) into the esophagus. This is called gastroesophageal reflux disease (GERD). GERD can irritate the esophagus. It can cause problems with swallowing or breathing. In severe cases, GERD can cause recurrent pneumonia or other serious problems.  Symptoms of reflux include burning, pressure or sharp pain in the upper abdomen or mid to lower chest. The pain can spread to the neck, back, or shoulder. There may be belching, an acid taste in the back of the throat, chronic cough, or sore throat or hoarseness. GERD symptoms often occur during the day after a big meal. They can also occur at night when lying down.   Home care  Lifestyle changes can help reduce symptoms. If needed, medicines may be prescribed. Symptoms often improve with treatment, but if treatment is stopped, the symptoms often return after a few months. So most persons with GERD will need to continue treatment.  Lifestyle changes  · Limit or avoid fatty, fried, and spicy foods, as well as coffee, chocolate, mint, and foods with high acid content such as tomatoes and citrus fruit and juices (orange, grapefruit, lemon).  · Dont eat large meals, especially at night. Frequent, smaller meals are best. Do not lie down right after eating. And dont eat anything 3 hours before going to bed.  · Avoid drinking alcohol and smoking. As much as possible, stay away from second hand smoke.  · If you are overweight, losing weight will reduce symptoms.   · Avoid wearing tight clothing around your stomach area.  · If your symptoms occur during sleep, use a foam wedge to elevate your upper body (not just your head.) Or, place 4" blocks under the head of your bed.  Medicines  If needed, medicines can help relieve the symptoms of GERD and prevent damage to the esophagus. Discuss a medicine " plan with your healthcare provider. This may include one or more of the following medicines:  · Antacids to help neutralize the normal acids in your stomach.  · Acid blockers (H2 blockers) to decrease acid production.  · Acid inhibitors (PPIs) to decrease acid production in a different way than the blockers. They may work better, but can take a little longer to take effect.  Take an antacid 30-60 minutes after eating and at bedtime, but not at the same time as an acid blocker.  Try not to take medicines such as ibuprofen and aspirin. If you are taking aspirin for your heart or other medical reasons, talk to your healthcare provider about stopping it.  Follow-up care  Follow up with your healthcare provider or as advised by our staff.  When to seek medical advice  Call your healthcare provider if any of the following occur:  · Stomach pain gets worse or moves to the lower right abdomen (appendix area)  · Chest pain appears or gets worse, or spreads to the back, neck, shoulder, or arm  · Frequent vomiting (cant keep down liquids)  · Blood in the stool or vomit (red or black in color)  · Feeling weak or dizzy  · Fever of 100.4ºF (38ºC) or higher, or as directed by your healthcare provider  Date Last Reviewed: 6/23/2015  © 0862-4189 [employer]. [address] [place] [address], [place] [address]. All rights reserved. This information is not intended as a substitute for professional medical care. Always follow your healthcare professional's instructions.        Medicines for GERD  Gastroesophageal reflux disease (GERD) can be treated with medicine. This may be done with a medicine you can buy over the counter. Or it may be done with a medicine that your healthcare provider has to prescribe. In some cases, both types may be used. Your provider will tell you what is best for your symptoms.  Antacids  Antacids work to weaken the acid in your stomach and can give you quick relief. You can buy many of them with no  prescription. Antacids can be high in sodium. This may be a problem if you have high blood pressure. Some antacids also have aluminum. This should be avoided if you have long-term (chronic) kidney disease. So check with your provider first. Take antacids only when you need to, as advised by your provider.  Side effects: Constipation, diarrhea. If you take too much medicine, it can cause calcium to build up.   H-2 blockers  H-2 blockers cause the stomach to make less acid. They are often used both on demand as symptoms occur, and daily to keep symptoms away. Your provider may prescribe them if antacids dont work for you. You can buy some of them over the counter. These come in a lower dosage.  Side effects: Confusion in older adults  Proton-pump inhibitors  These also cause the stomach to make less acid. They reduce stomach acid more than H-2 blockers. They may be used for a short time, or longer to treat certain conditions. You can buy some of them over the counter. Or your provider may prescribe them. They help control GERD symptoms.  Side effects: Belly (abdominal) pain, diarrhea, upset stomach (nausea). Possible other side effects linked to long-term use and high doses.  Prokinetics  These medicines affect the movement of the digestive tract. They may be recommended if your stomach is emptying too slowly. But in most cases they are not recommended for treating GERD.   Side effects: Tiredness, depression, anxiety, problems with physical movement, belly cramps, constipation, diarrhea, a jittery feeling  Medicines to avoid  Dont take aspirin without your providers approval. And dont take a nonsteroidal anti-inflammatory drug (NSAID), such as ibuprofen. These reduce the protective lining of your stomach. This can lead to more GERD symptoms. Check with your provider or pharmacist before taking a new medicine.   Date Last Reviewed: 7/1/2016  © 7471-0486 [employer]. [address] [facility], [place]  [address]. All rights reserved. This information is not intended as a substitute for professional medical care. Always follow your healthcare professional's instructions.

## 2018-06-07 NOTE — ASSESSMENT & PLAN NOTE
He reports typical GERD symptoms as follows. QUALITY described as burning dyspepsia. LOCATION reported as epigastric with radiation upwards. SEVERITY described as moderate. EXACERBATING FACTORS include recumbent positioning and certain foods. ALLEVIATING FACTORS include OTC antacids, which provide transient symptom relief. He reports no vomiting, blood in stool, or dark tarry stool.

## 2018-06-11 ENCOUNTER — OFFICE VISIT (OUTPATIENT)
Dept: INTERNAL MEDICINE | Facility: CLINIC | Age: 23
End: 2018-06-11
Payer: COMMERCIAL

## 2018-06-11 VITALS
OXYGEN SATURATION: 93 % | BODY MASS INDEX: 26.9 KG/M2 | SYSTOLIC BLOOD PRESSURE: 130 MMHG | HEIGHT: 68 IN | DIASTOLIC BLOOD PRESSURE: 78 MMHG | HEART RATE: 93 BPM | WEIGHT: 177.5 LBS | TEMPERATURE: 98 F

## 2018-06-11 DIAGNOSIS — F90.2 ATTENTION DEFICIT HYPERACTIVITY DISORDER, COMBINED TYPE: Chronic | ICD-10-CM

## 2018-06-11 PROCEDURE — 99213 OFFICE O/P EST LOW 20 MIN: CPT | Mod: S$GLB,,, | Performed by: FAMILY MEDICINE

## 2018-06-11 PROCEDURE — 3008F BODY MASS INDEX DOCD: CPT | Mod: CPTII,S$GLB,, | Performed by: FAMILY MEDICINE

## 2018-06-11 PROCEDURE — 99999 PR PBB SHADOW E&M-EST. PATIENT-LVL III: CPT | Mod: PBBFAC,,, | Performed by: FAMILY MEDICINE

## 2018-06-11 RX ORDER — DEXTROAMPHETAMINE SACCHARATE, AMPHETAMINE ASPARTATE, DEXTROAMPHETAMINE SULFATE AND AMPHETAMINE SULFATE 5; 5; 5; 5 MG/1; MG/1; MG/1; MG/1
1 TABLET ORAL
Qty: 90 TABLET | Refills: 0 | Status: SHIPPED | OUTPATIENT
Start: 2018-07-11 | End: 2018-07-06 | Stop reason: SDUPTHER

## 2018-06-11 RX ORDER — DEXTROAMPHETAMINE SACCHARATE, AMPHETAMINE ASPARTATE, DEXTROAMPHETAMINE SULFATE AND AMPHETAMINE SULFATE 5; 5; 5; 5 MG/1; MG/1; MG/1; MG/1
1 TABLET ORAL
Qty: 90 TABLET | Refills: 0 | Status: SHIPPED | OUTPATIENT
Start: 2018-06-11 | End: 2018-08-29 | Stop reason: SDUPTHER

## 2018-06-11 RX ORDER — DEXTROAMPHETAMINE SACCHARATE, AMPHETAMINE ASPARTATE, DEXTROAMPHETAMINE SULFATE AND AMPHETAMINE SULFATE 5; 5; 5; 5 MG/1; MG/1; MG/1; MG/1
1 TABLET ORAL
Qty: 90 TABLET | Refills: 0 | Status: SHIPPED | OUTPATIENT
Start: 2018-08-11 | End: 2018-07-06 | Stop reason: SDUPTHER

## 2018-06-11 NOTE — PROGRESS NOTES
"NOTE: His attention deficit disorder has been stable. I told him that if he continued to do well, he could send me a status update in 2-3 months and request refills, and he would return to see me three months thereafter, assuming his condition remained stable.     CHIEF COMPLAINT  Medication Refill      HISTORY OF PRESENT ILLNESS  He reports that his current treatment is providing satisfactory relief of his attention deficit disorder symptoms and helping him compensate for his deficits at work. He reports that he is tolerating his current treatment well. he reports no mood instability, tics, or disordered sleep, or other apparent adverse effects. He is demonstrating no behaviors to suggest inappropriate use of prescribed medications. [place] Board of Pharmacy Controlled Prescription Drug Monitoring database was queried and showed no activity to suggest abuse, diversion, or other inappropriate use of prescription medications.     Problem List Items Addressed This Visit        Psychiatric    Attention deficit hyperactivity disorder, combined type (Chronic)    Relevant Medications    dextroamphetamine-amphetamine (ADDERALL) 20 mg tablet    dextroamphetamine-amphetamine (ADDERALL) 20 mg tablet (Start on 7/11/2018)    dextroamphetamine-amphetamine (ADDERALL) 20 mg tablet (Start on 8/11/2018)          PAST MEDICAL HISTORY, FAMILY HISTORY and SOCIAL HISTORY, CURRENT MEDICATION LIST, and ALLERGY LIST reviewed by me (JOHANNE Retana MD) and are updated consistent with the patient's report.    REVIEW OF SYSTEMS  PSYCHIATRIC: No mood instability reported.  NEUROLOGIC: No seizures or tics reported.     PHYSICAL EXAM  Vitals:    06/11/18 1609   BP: 130/78   BP Location: Right arm   Patient Position: Sitting   BP Method: Medium (Manual)   Pulse: 93   Temp: 97.9 °F (36.6 °C)   TempSrc: Tympanic   SpO2: (!) 93%   Weight: 80.5 kg (177 lb 7.5 oz)   Height: 5' 8" (1.727 m)     CONST: Vital signs noted. No apparent distress. " Appears well.  PULM: Breathing unlabored.  CV: Heart regular.  DERM: Warm and moist. Normal turgor.  PSYCH: Alert and oriented x 3. Mood is grossly euthymic. Affect appropriate. Judgment and insight not grossly compromised.     ASSESSMENT and PLAN  Attention deficit hyperactivity disorder, combined type  -     dextroamphetamine-amphetamine (ADDERALL) 20 mg tablet; Take 1 tablet by mouth every 4 to 6 hours as needed (for ADD - MAX 3 TABLET PER DAY).  Dispense: 90 tablet; Refill: 0  -     dextroamphetamine-amphetamine (ADDERALL) 20 mg tablet; Take 1 tablet by mouth every 4 to 6 hours as needed (for ADD - MAX 3 TABLET PER DAY).  Dispense: 90 tablet; Refill: 0  -     dextroamphetamine-amphetamine (ADDERALL) 20 mg tablet; Take 1 tablet by mouth every 4 to 6 hours as needed (for ADD - MAX 3 TABLET PER DAY).  Dispense: 90 tablet; Refill: 0        PRESCRIPTION MEDICATION MANAGEMENT  Except as noted below, CURRENT MEDICATIONS are to remain unchanged from that listed above.    Medications Ordered This Encounter      dextroamphetamine-amphetamine (ADDERALL) 20 mg tablet          Sig: Take 1 tablet by mouth every 4 to 6 hours as needed (for ADD - MAX 3 TABLET PER DAY).          Dispense:  90 tablet          Refill:  0      dextroamphetamine-amphetamine (ADDERALL) 20 mg tablet          Sig: Take 1 tablet by mouth every 4 to 6 hours as needed (for ADD - MAX 3 TABLET PER DAY).          Dispense:  90 tablet          Refill:  0      dextroamphetamine-amphetamine (ADDERALL) 20 mg tablet          Sig: Take 1 tablet by mouth every 4 to 6 hours as needed (for ADD - MAX 3 TABLET PER DAY).          Dispense:  90 tablet          Refill:  0  Medications Discontinued During This Encounter   Medication Reason    dextroamphetamine-amphetamine (ADDERALL) 20 mg tablet Reorder    dextroamphetamine-amphetamine (ADDERALL) 20 mg tablet Reorder    dextroamphetamine-amphetamine (ADDERALL) 20 mg tablet Reorder       No Follow-up on file.    ABOUT  "THIS DOCUMENTATION:  · The order of the conditions listed in the HPI is one of convenience and does not necessarily reflect the chronology of the appointment, nor the relative importance of a condition. It is possible that additional description or status details about condition(s) may be found elsewhere in the EHR documentation for today's encounter.  · Documentation entered by me for this encounter was done in part using speech-recognition technology. Although I have made an effort to ensure accuracy, "sound like" errors may exist and should be interpreted in context.                        -JOHANNE Retana MD    There are no Patient Instructions on file for this visit.          "

## 2018-07-05 ENCOUNTER — TELEPHONE (OUTPATIENT)
Dept: INTERNAL MEDICINE | Facility: CLINIC | Age: 23
End: 2018-07-05

## 2018-07-05 NOTE — TELEPHONE ENCOUNTER
----- Message from Evelia Pace sent at 7/5/2018  2:00 PM CDT -----  Contact: self [phone]  States that he needs a new rx for Adderall 20mg 3x daily sent to [id number] in [place]. Please call back at [phone]//thank you acc

## 2018-07-05 NOTE — TELEPHONE ENCOUNTER
Patient states he spoke with the [facility] pharmacy about transffering is Adderall rx , but was told they cannot transfer his presciption to the [facility] because it is a controlled substance

## 2018-07-06 ENCOUNTER — TELEPHONE (OUTPATIENT)
Dept: INTERNAL MEDICINE | Facility: CLINIC | Age: 23
End: 2018-07-06

## 2018-07-06 DIAGNOSIS — F90.2 ATTENTION DEFICIT HYPERACTIVITY DISORDER, COMBINED TYPE: Chronic | ICD-10-CM

## 2018-07-06 RX ORDER — DEXTROAMPHETAMINE SACCHARATE, AMPHETAMINE ASPARTATE, DEXTROAMPHETAMINE SULFATE AND AMPHETAMINE SULFATE 5; 5; 5; 5 MG/1; MG/1; MG/1; MG/1
1 TABLET ORAL
Qty: 90 TABLET | Refills: 0 | Status: SHIPPED | OUTPATIENT
Start: 2018-08-11 | End: 2018-08-29 | Stop reason: SDUPTHER

## 2018-07-06 RX ORDER — DEXTROAMPHETAMINE SACCHARATE, AMPHETAMINE ASPARTATE, DEXTROAMPHETAMINE SULFATE AND AMPHETAMINE SULFATE 5; 5; 5; 5 MG/1; MG/1; MG/1; MG/1
1 TABLET ORAL
Qty: 90 TABLET | Refills: 0 | Status: SHIPPED | OUTPATIENT
Start: 2018-07-11 | End: 2018-08-29 | Stop reason: SDUPTHER

## 2018-07-06 NOTE — TELEPHONE ENCOUNTER
----- Message from Hortencia Lozano sent at 7/6/2018  4:03 PM CDT -----  Contact: patient  Calling concerning the status of RX transfer. Please call patient today ASAP @ [phone]. Thanks, rudy

## 2018-07-06 NOTE — TELEPHONE ENCOUNTER
Notified Delaney, pharmacist, at [facility] ([facility]) to cancel the Adderall eRxs for July and August per Dr. Retana due to transfer. She verbalized understanding.

## 2018-07-06 NOTE — TELEPHONE ENCOUNTER
Patient reported he is still awaiting Adderall eRx transfer to different pharmacy. Confirmed patient's preferred pharmacy, medications, and allergies. Informed patient I would pass request on to Dr. Retana. Patient verbalized understanding and ended call.

## 2018-07-10 NOTE — TELEPHONE ENCOUNTER
He was sent eRx on 7/11/2018 for dextroamphetamine-amphetamine (ADDERALL) 20 mg tablet, a quantity of 90 tablet(s) with 0 additional refills to the pharmacy chosen by the patient, [facility] [address] [place] [address], [place] [address] [address], and the pharmacy confirmed their receipt of the prescription 7/6/2018 at 6:03 PM CDT. He should not need a new Rx now.

## 2018-08-27 ENCOUNTER — PATIENT MESSAGE (OUTPATIENT)
Dept: INTERNAL MEDICINE | Facility: CLINIC | Age: 23
End: 2018-08-27

## 2018-08-27 DIAGNOSIS — F90.2 ATTENTION DEFICIT HYPERACTIVITY DISORDER, COMBINED TYPE: Chronic | ICD-10-CM

## 2018-08-29 RX ORDER — DEXTROAMPHETAMINE SACCHARATE, AMPHETAMINE ASPARTATE, DEXTROAMPHETAMINE SULFATE AND AMPHETAMINE SULFATE 5; 5; 5; 5 MG/1; MG/1; MG/1; MG/1
1 TABLET ORAL
Qty: 90 TABLET | Refills: 0 | Status: SHIPPED | OUTPATIENT
Start: 2018-09-29 | End: 2018-09-26 | Stop reason: SDUPTHER

## 2018-08-29 RX ORDER — DEXTROAMPHETAMINE SACCHARATE, AMPHETAMINE ASPARTATE, DEXTROAMPHETAMINE SULFATE AND AMPHETAMINE SULFATE 5; 5; 5; 5 MG/1; MG/1; MG/1; MG/1
1 TABLET ORAL
Qty: 90 TABLET | Refills: 0 | Status: SHIPPED | OUTPATIENT
Start: 2018-08-29 | End: 2018-09-26 | Stop reason: SDUPTHER

## 2018-08-29 RX ORDER — DEXTROAMPHETAMINE SACCHARATE, AMPHETAMINE ASPARTATE, DEXTROAMPHETAMINE SULFATE AND AMPHETAMINE SULFATE 5; 5; 5; 5 MG/1; MG/1; MG/1; MG/1
1 TABLET ORAL
Qty: 90 TABLET | Refills: 0 | Status: SHIPPED | OUTPATIENT
Start: 2018-10-29 | End: 2018-09-26 | Stop reason: SDUPTHER

## 2018-08-29 NOTE — TELEPHONE ENCOUNTER
He is demonstrating no behaviors to suggest inappropriate use of prescribed medications. [place] Board of Pharmacy Controlled Prescription Drug Monitoring database was queried and showed no activity to suggest abuse, diversion, or other inappropriate use of prescription medications.

## 2018-09-26 ENCOUNTER — TELEPHONE (OUTPATIENT)
Dept: INTERNAL MEDICINE | Facility: CLINIC | Age: 23
End: 2018-09-26

## 2018-09-26 DIAGNOSIS — F90.2 ATTENTION DEFICIT HYPERACTIVITY DISORDER, COMBINED TYPE: Chronic | ICD-10-CM

## 2018-09-26 RX ORDER — DEXTROAMPHETAMINE SACCHARATE, AMPHETAMINE ASPARTATE, DEXTROAMPHETAMINE SULFATE AND AMPHETAMINE SULFATE 5; 5; 5; 5 MG/1; MG/1; MG/1; MG/1
1 TABLET ORAL
Qty: 90 TABLET | Refills: 0 | Status: SHIPPED | OUTPATIENT
Start: 2018-10-29 | End: 2018-12-27

## 2018-09-26 RX ORDER — DEXTROAMPHETAMINE SACCHARATE, AMPHETAMINE ASPARTATE, DEXTROAMPHETAMINE SULFATE AND AMPHETAMINE SULFATE 5; 5; 5; 5 MG/1; MG/1; MG/1; MG/1
1 TABLET ORAL
Qty: 90 TABLET | Refills: 0 | Status: SHIPPED | OUTPATIENT
Start: 2018-09-29 | End: 2018-12-27 | Stop reason: SDUPTHER

## 2018-09-26 NOTE — TELEPHONE ENCOUNTER
Read my patient portal message (below). No further action is required unless you need to notify the patient.  --------------------------------------------------------------------------------     Shayne Guillermo.    I understand that your pharmacy could not find the prescriptions for Adderall that I sent electronically on 8/29/2018. To fix this, I have sent replacement prescriptions to the same pharmacy, as listed below. (You will need to  your medications from that pharmacy, because this type of prescription cannot be transferred from pharmacy to pharmacy.)    I'm sorry you had a problem with this. Let me know if you need anything else from me.    Thanks for letting me care for you.    Sincerely,    JOHANNE Retana MD     Medications Ordered This Encounter   Medications    dextroamphetamine-amphetamine (ADDERALL) 20 mg tablet     Sig: Take 1 tablet by mouth every 4 to 6 hours as needed (for ADD - MAX 3 TABLET PER DAY).     Dispense:  90 tablet     Refill:  0     This prescription may be filled in a timely manner after completion of prior dispensed supply of this medication.     dextroamphetamine-amphetamine (ADDERALL) 20 mg tablet     Sig: Take 1 tablet by mouth every 4 to 6 hours as needed (for ADD - MAX 3 TABLET PER DAY).     Dispense:  90 tablet     Refill:  0     This prescription may be filled in a timely manner after completion of prior dispensed supply of this medication.      SENT TO:   [facility] [id number] [place], [place] [id number], [address] [place] [address] [place] [address]   Phone: [phone] Fax: [phone]

## 2018-09-26 NOTE — TELEPHONE ENCOUNTER
Spoke with pharmacist with [facility] [place] Per pharmacist Rx for adderall dated 9/29/2018 and 10/29/2018 was not received electronically and needs to be resent. Will notify provider for advice.//ddw

## 2018-12-27 ENCOUNTER — OFFICE VISIT (OUTPATIENT)
Dept: INTERNAL MEDICINE | Facility: CLINIC | Age: 23
End: 2018-12-27
Payer: COMMERCIAL

## 2018-12-27 ENCOUNTER — LAB VISIT (OUTPATIENT)
Dept: LAB | Facility: HOSPITAL | Age: 23
End: 2018-12-27
Attending: FAMILY MEDICINE
Payer: COMMERCIAL

## 2018-12-27 VITALS
WEIGHT: 189.81 LBS | HEIGHT: 68 IN | DIASTOLIC BLOOD PRESSURE: 88 MMHG | BODY MASS INDEX: 28.77 KG/M2 | TEMPERATURE: 98 F | HEART RATE: 106 BPM | SYSTOLIC BLOOD PRESSURE: 120 MMHG

## 2018-12-27 DIAGNOSIS — Z00.00 PREVENTATIVE HEALTH CARE: ICD-10-CM

## 2018-12-27 DIAGNOSIS — Z11.3 ROUTINE SCREENING FOR STI (SEXUALLY TRANSMITTED INFECTION): ICD-10-CM

## 2018-12-27 DIAGNOSIS — R03.0 ELEVATED SYSTOLIC BLOOD PRESSURE READING WITHOUT DIAGNOSIS OF HYPERTENSION: ICD-10-CM

## 2018-12-27 DIAGNOSIS — Z13.220 SCREENING FOR LIPID DISORDERS: ICD-10-CM

## 2018-12-27 DIAGNOSIS — Z13.1 SCREENING FOR DIABETES MELLITUS: ICD-10-CM

## 2018-12-27 DIAGNOSIS — Z11.4 SCREENING FOR HIV WITHOUT PRESENCE OF RISK FACTORS: ICD-10-CM

## 2018-12-27 DIAGNOSIS — E66.3 OVERWEIGHT (BMI 25.0-29.9): ICD-10-CM

## 2018-12-27 DIAGNOSIS — Z23 NEEDS FLU SHOT: ICD-10-CM

## 2018-12-27 DIAGNOSIS — F90.2 ATTENTION DEFICIT HYPERACTIVITY DISORDER, COMBINED TYPE: Primary | Chronic | ICD-10-CM

## 2018-12-27 DIAGNOSIS — K21.9 GASTROESOPHAGEAL REFLUX DISEASE WITHOUT ESOPHAGITIS: Chronic | ICD-10-CM

## 2018-12-27 DIAGNOSIS — F17.211 CIGARETTE NICOTINE DEPENDENCE IN REMISSION: ICD-10-CM

## 2018-12-27 LAB
CHOLEST SERPL-MCNC: 213 MG/DL
CHOLEST/HDLC SERPL: 3.7 {RATIO}
GLUCOSE SERPL-MCNC: 89 MG/DL
HDLC SERPL-MCNC: 57 MG/DL
HDLC SERPL: 26.8 %
LDLC SERPL CALC-MCNC: 139.2 MG/DL
NONHDLC SERPL-MCNC: 156 MG/DL
TRIGL SERPL-MCNC: 84 MG/DL

## 2018-12-27 PROCEDURE — 86703 HIV-1/HIV-2 1 RESULT ANTBDY: CPT

## 2018-12-27 PROCEDURE — 90471 IMMUNIZATION ADMIN: CPT | Mod: S$GLB,,, | Performed by: FAMILY MEDICINE

## 2018-12-27 PROCEDURE — 82947 ASSAY GLUCOSE BLOOD QUANT: CPT

## 2018-12-27 PROCEDURE — 86592 SYPHILIS TEST NON-TREP QUAL: CPT

## 2018-12-27 PROCEDURE — 90686 IIV4 VACC NO PRSV 0.5 ML IM: CPT | Mod: S$GLB,,, | Performed by: FAMILY MEDICINE

## 2018-12-27 PROCEDURE — 3008F BODY MASS INDEX DOCD: CPT | Mod: CPTII,S$GLB,, | Performed by: FAMILY MEDICINE

## 2018-12-27 PROCEDURE — 99214 OFFICE O/P EST MOD 30 MIN: CPT | Mod: 25,S$GLB,, | Performed by: FAMILY MEDICINE

## 2018-12-27 PROCEDURE — 36415 COLL VENOUS BLD VENIPUNCTURE: CPT | Mod: PO

## 2018-12-27 PROCEDURE — 99999 PR PBB SHADOW E&M-EST. PATIENT-LVL III: CPT | Mod: PBBFAC,,, | Performed by: FAMILY MEDICINE

## 2018-12-27 PROCEDURE — 80061 LIPID PANEL: CPT

## 2018-12-27 RX ORDER — DEXTROAMPHETAMINE SACCHARATE, AMPHETAMINE ASPARTATE, DEXTROAMPHETAMINE SULFATE AND AMPHETAMINE SULFATE 5; 5; 5; 5 MG/1; MG/1; MG/1; MG/1
1 TABLET ORAL
Qty: 90 TABLET | Refills: 0 | Status: SHIPPED | OUTPATIENT
Start: 2019-02-27 | End: 2019-03-21 | Stop reason: SDUPTHER

## 2018-12-27 RX ORDER — DEXTROAMPHETAMINE SACCHARATE, AMPHETAMINE ASPARTATE, DEXTROAMPHETAMINE SULFATE AND AMPHETAMINE SULFATE 5; 5; 5; 5 MG/1; MG/1; MG/1; MG/1
1 TABLET ORAL
Qty: 90 TABLET | Refills: 0 | Status: SHIPPED | OUTPATIENT
Start: 2019-01-27 | End: 2019-03-21 | Stop reason: SDUPTHER

## 2018-12-27 RX ORDER — DEXTROAMPHETAMINE SACCHARATE, AMPHETAMINE ASPARTATE, DEXTROAMPHETAMINE SULFATE AND AMPHETAMINE SULFATE 5; 5; 5; 5 MG/1; MG/1; MG/1; MG/1
1 TABLET ORAL
Qty: 90 TABLET | Refills: 0 | Status: SHIPPED | OUTPATIENT
Start: 2018-12-27 | End: 2019-03-21 | Stop reason: SDUPTHER

## 2018-12-27 NOTE — ASSESSMENT & PLAN NOTE
He is currently at the maintenance stage of smoking cessation (remaining a non-smoker). Smoking cessation encouraged.

## 2018-12-27 NOTE — ASSESSMENT & PLAN NOTE
He reports that his current treatment is providing satisfactory relief of his attention deficit disorder symptoms and helping him compensate for his deficits at work. 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 He reports that he is tolerating his current treatment well. He reports no mood instability, tics, or disordered sleep, or other apparent adverse effects. He is demonstrating no behaviors to suggest inappropriate use of prescribed medications. [place] Board of Pharmacy Controlled Prescription Drug Monitoring database was queried and showed no activity to suggest abuse, diversion, or other inappropriate use of prescription medications. NOTE: His attention deficit disorder has been stable. I told him that if he continued to do well, he could send me a status update in 2-3 months and request refills, and he would return to see me three months thereafter, assuming his condition remained stable.

## 2018-12-27 NOTE — ASSESSMENT & PLAN NOTE
BP Readings from Last 5 Encounters:   12/27/18 120/88   06/11/18 130/78   03/19/18 124/84   12/27/17 124/70   10/06/17 118/82   Therapeutic lifestyle changes encouraged.

## 2018-12-27 NOTE — PROGRESS NOTES
CHIEF COMPLAINT  Medication Refill      HISTORY OF PRESENT ILLNESS    Problem List Items Addressed This Visit        Psychiatric    Attention deficit hyperactivity disorder, combined type - Primary (Chronic)    Current Assessment & Plan     He reports that his current treatment is providing satisfactory relief of his attention deficit disorder symptoms and helping him compensate for his deficits at work. 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 He reports that he is tolerating his current treatment well. He reports no mood instability, tics, or disordered sleep, or other apparent adverse effects. He is demonstrating no behaviors to suggest inappropriate use of prescribed medications. [place] Board of Pharmacy Controlled Prescription Drug Monitoring database was queried and showed no activity to suggest abuse, diversion, or other inappropriate use of prescription medications. NOTE: His attention deficit disorder has been stable. I told him that if he continued to do well, he could send me a status update in 2-3 months and request refills, and he would return to see me three months thereafter, assuming his condition remained stable.          Relevant Medications    dextroamphetamine-amphetamine (ADDERALL) 20 mg tablet    dextroamphetamine-amphetamine (ADDERALL) 20 mg tablet (Start on 1/27/2019)    dextroamphetamine-amphetamine (ADDERALL) 20 mg tablet (Start on 2/27/2019)       Cardiac/Vascular    Elevated systolic blood pressure reading without diagnosis of hypertension    Current Assessment & Plan     BP Readings from Last 5 Encounters:   12/27/18 120/88   06/11/18 130/78   03/19/18 124/84   12/27/17 124/70   10/06/17 118/82   Therapeutic lifestyle changes encouraged.             Endocrine    Overweight (BMI 25.0-29.9)    Current Assessment & Plan     Wt Readings from Last 5 Encounters:   12/27/18 86.1 kg (189 lb 13.1 oz)   06/11/18 80.5 kg (177 lb 7.5 oz)   03/19/18 79.8 kg (175 lb  "14.8 oz)   12/27/17 78 kg (171 lb 15.3 oz)   10/06/17 75 kg (165 lb 5.5 oz)   This condition appears to be WORSE.  Therapeutic lifestyle changes encouraged.             GI    Gastroesophageal reflux disease without esophagitis (Chronic)    Current Assessment & Plan     This condition appears to be WELL CONTROLLED. This condition appears to be STABLE.             Other    Cigarette nicotine dependence in remission    Current Assessment & Plan     He is currently at the maintenance stage of smoking cessation (remaining a non-smoker). Smoking cessation encouraged.            Other Visit Diagnoses     Preventative health care        Relevant Orders    Lipid panel    Glucose, random    HIV 1/2 Ag/Ab (4th Gen)    RPR    Influenza - Quadrivalent (Recombinant) (PF)    Screening for HIV without presence of risk factors        Relevant Orders    HIV 1/2 Ag/Ab (4th Gen)    Routine screening for STI (sexually transmitted infection)        Relevant Orders    RPR    Screening for diabetes mellitus        Relevant Orders    Glucose, random    Screening for lipid disorders        Relevant Orders    Lipid panel    Needs flu shot        Relevant Orders    Influenza - Quadrivalent (Recombinant) (PF)          PAST MEDICAL HISTORY, FAMILY HISTORY and SOCIAL HISTORY, CURRENT MEDICATION LIST, and ALLERGY LIST reviewed by me (JOHANNE Retana MD) and are updated consistent with the patient's report.    REVIEW OF SYSTEMS  PSYCHIATRIC: No depression or mood instability.  CARDIOVASCULAR: No angina or orthopnea reported.   ENDOCRINE: No polyuria or polydipsia reported.       PHYSICAL EXAM  Vitals:    12/27/18 1035   BP: 120/88   BP Location: Right arm   Patient Position: Sitting   Pulse: 106   Temp: 98 °F (36.7 °C)   TempSrc: Tympanic   Weight: 86.1 kg (189 lb 13.1 oz)   Height: 5' 8" (1.727 m)     CONSTITUTIONAL: Vital signs noted. No apparent distress. Does not appear acutely ill or septic. Appears adequately hydrated.  PULM: Breathing " unlabored.  CV: Heart regular.  DERM: Skin normothermic.  PSYCHIATRIC: Alert and conversant. Grossly oriented. Mood is grossly neutral. Affect appropriate. Judgment and insight not grossly compromised.    ASSESSMENT and PLAN  Attention deficit hyperactivity disorder, combined type  -     dextroamphetamine-amphetamine (ADDERALL) 20 mg tablet; Take 1 tablet by mouth every 4 to 6 hours as needed (for ADD - MAX 3 TABLET PER DAY).  Dispense: 90 tablet; Refill: 0  -     dextroamphetamine-amphetamine (ADDERALL) 20 mg tablet; Take 1 tablet by mouth every 4 to 6 hours as needed (for ADD - MAX 3 TABLET PER DAY).  Dispense: 90 tablet; Refill: 0  -     dextroamphetamine-amphetamine (ADDERALL) 20 mg tablet; Take 1 tablet by mouth every 4 to 6 hours as needed (for ADD - MAX 3 TABLET PER DAY). - MAY REQUEST REFILL AT LEAST 1 WEEK IN ADVANCE  Dispense: 90 tablet; Refill: 0    Gastroesophageal reflux disease without esophagitis    Elevated systolic blood pressure reading without diagnosis of hypertension    Overweight (BMI 25.0-29.9)    Cigarette nicotine dependence in remission    Preventative health care  -     Lipid panel; Future; Expected date: 12/27/2018  -     Glucose, random; Future; Expected date: 12/27/2018  -     HIV 1/2 Ag/Ab (4th Gen); Future; Expected date: 12/27/2018  -     RPR; Future; Expected date: 12/27/2018  -     Influenza - Quadrivalent (Recombinant) (PF)    Screening for HIV without presence of risk factors  -     HIV 1/2 Ag/Ab (4th Gen); Future; Expected date: 12/27/2018    Routine screening for STI (sexually transmitted infection)  -     RPR; Future; Expected date: 12/27/2018    Screening for diabetes mellitus  -     Glucose, random; Future; Expected date: 12/27/2018    Screening for lipid disorders  -     Lipid panel; Future; Expected date: 12/27/2018    Needs flu shot  -     Influenza - Quadrivalent (Recombinant) (PF)        PRESCRIPTION MEDICATION MANAGEMENT  Medications Ordered This Encounter  "  Medications    dextroamphetamine-amphetamine (ADDERALL) 20 mg tablet     Sig: Take 1 tablet by mouth every 4 to 6 hours as needed (for ADD - MAX 3 TABLET PER DAY).     Dispense:  90 tablet     Refill:  0     This prescription may be filled in a timely manner after completion of prior dispensed supply of this medication.     dextroamphetamine-amphetamine (ADDERALL) 20 mg tablet     Sig: Take 1 tablet by mouth every 4 to 6 hours as needed (for ADD - MAX 3 TABLET PER DAY).     Dispense:  90 tablet     Refill:  0     This prescription may be filled in a timely manner after completion of prior dispensed supply of this medication.     dextroamphetamine-amphetamine (ADDERALL) 20 mg tablet     Sig: Take 1 tablet by mouth every 4 to 6 hours as needed (for ADD - MAX 3 TABLET PER DAY). - MAY REQUEST REFILL AT LEAST 1 WEEK IN ADVANCE     Dispense:  90 tablet     Refill:  0     This prescription may be filled in a timely manner after completion of prior dispensed supply of this medication.      Medications Discontinued During This Encounter   Medication Reason    dextroamphetamine-amphetamine (ADDERALL) 20 mg tablet     dextroamphetamine-amphetamine (ADDERALL) 20 mg tablet Reorder       Follow-up in about 6 months (around 6/27/2019) for re-evaluate problem(s) discussed today.    Patient Instructions   Learn about a heart-healthy lifestyle at the website of the American Heart Association, www.heart.org.        ABOUT THIS DOCUMENTATION:  · The order of the conditions listed in the HPI is one of convenience and does not necessarily reflect the chronology of the appointment, nor the relative importance of a condition.  · Documentation entered by me for this encounter was done in part using speech-recognition technology. Although I have made an effort to ensure accuracy, "sound like" errors may exist and should be interpreted in context.                        -JOHANNE Retana MD     "

## 2018-12-27 NOTE — PATIENT INSTRUCTIONS
Learn about a heart-healthy lifestyle at the website of the American Heart Association, www.heart.org.

## 2018-12-27 NOTE — ASSESSMENT & PLAN NOTE
Wt Readings from Last 5 Encounters:   12/27/18 86.1 kg (189 lb 13.1 oz)   06/11/18 80.5 kg (177 lb 7.5 oz)   03/19/18 79.8 kg (175 lb 14.8 oz)   12/27/17 78 kg (171 lb 15.3 oz)   10/06/17 75 kg (165 lb 5.5 oz)   This condition appears to be WORSE.  Therapeutic lifestyle changes encouraged.

## 2018-12-28 LAB
HIV 1+2 AB+HIV1 P24 AG SERPL QL IA: NEGATIVE
RPR SER QL: NORMAL

## 2019-01-10 NOTE — PROGRESS NOTES
"Shayne Guillermo.      I'm happy to report that these test results are NORMAL or at least ACCEPTABLE.    If you have any questions about your test results, write them down and bring them with you to your next appointment. You can call or message me in the meantime if you have urgent questions.    Thank you for letting me care for you. I look forward to seeing you at your next appointment.    Sincerely,    JOHANNE Retana MD    P.S. - Want to learn more about your test results and what they mean? It's as simple as 1, 2, 3.     (1) Log in to your [facility] account at [email or web address]     (2) From the "View test results" tab, click on the test you want to know more about.     (3) Click on the "About This Test" link."

## 2019-03-20 ENCOUNTER — PATIENT MESSAGE (OUTPATIENT)
Dept: INTERNAL MEDICINE | Facility: CLINIC | Age: 24
End: 2019-03-20

## 2019-03-20 DIAGNOSIS — F90.2 ATTENTION DEFICIT HYPERACTIVITY DISORDER, COMBINED TYPE: Chronic | ICD-10-CM

## 2019-03-21 RX ORDER — DEXTROAMPHETAMINE SACCHARATE, AMPHETAMINE ASPARTATE, DEXTROAMPHETAMINE SULFATE AND AMPHETAMINE SULFATE 5; 5; 5; 5 MG/1; MG/1; MG/1; MG/1
1 TABLET ORAL
Qty: 90 TABLET | Refills: 0 | Status: SHIPPED | OUTPATIENT
Start: 2019-03-21 | End: 2019-07-01 | Stop reason: SDUPTHER

## 2019-03-21 RX ORDER — DEXTROAMPHETAMINE SACCHARATE, AMPHETAMINE ASPARTATE, DEXTROAMPHETAMINE SULFATE AND AMPHETAMINE SULFATE 5; 5; 5; 5 MG/1; MG/1; MG/1; MG/1
1 TABLET ORAL
Qty: 90 TABLET | Refills: 0 | Status: SHIPPED | OUTPATIENT
Start: 2019-05-21 | End: 2019-07-01 | Stop reason: SDUPTHER

## 2019-03-21 RX ORDER — DEXTROAMPHETAMINE SACCHARATE, AMPHETAMINE ASPARTATE, DEXTROAMPHETAMINE SULFATE AND AMPHETAMINE SULFATE 5; 5; 5; 5 MG/1; MG/1; MG/1; MG/1
1 TABLET ORAL
Qty: 90 TABLET | Refills: 0 | Status: SHIPPED | OUTPATIENT
Start: 2019-04-21 | End: 2019-07-01 | Stop reason: SDUPTHER

## 2019-03-21 NOTE — TELEPHONE ENCOUNTER
At his last appointment he was doing well from the standpoint of his ADD, and he is requesting continuation of treatment. [place] Board of Pharmacy Controlled Prescription Drug Monitoring database was queried and showed no activity to suggest abuse, diversion, or other inappropriate use of prescription medications.    I provided him refill prescriptions listed below and notified him by patient portal message. (See patient portal message linked with this encounter.)    He will need to be seen by me for re-evaluation before he receives any additional refills.    Medications Ordered This Encounter   Medications    dextroamphetamine-amphetamine (ADDERALL) 20 mg tablet     Sig: Take 1 tablet by mouth every 4 to 6 hours as needed (for ADD - MAX 3 TABLET PER DAY).     Dispense:  90 tablet     Refill:  0     This prescription may be filled in a timely manner after completion of prior dispensed supply of this medication.     dextroamphetamine-amphetamine (ADDERALL) 20 mg tablet     Sig: Take 1 tablet by mouth every 4 to 6 hours as needed (for ADD - MAX 3 TABLET PER DAY).     Dispense:  90 tablet     Refill:  0     This prescription may be filled in a timely manner after completion of prior dispensed supply of this medication.     dextroamphetamine-amphetamine (ADDERALL) 20 mg tablet     Sig: Take 1 tablet by mouth every 4 to 6 hours as needed (for ADD - MAX 3 TABLET PER DAY). - APPOINTMENT REQUIRED FOR MORE REFILLS     Dispense:  90 tablet     Refill:  0     This prescription may be filled in a timely manner after completion of prior dispensed supply of this medication.

## 2019-07-01 ENCOUNTER — OFFICE VISIT (OUTPATIENT)
Dept: INTERNAL MEDICINE | Facility: CLINIC | Age: 24
End: 2019-07-01
Payer: COMMERCIAL

## 2019-07-01 VITALS
BODY MASS INDEX: 27.7 KG/M2 | DIASTOLIC BLOOD PRESSURE: 98 MMHG | WEIGHT: 182.75 LBS | HEART RATE: 99 BPM | TEMPERATURE: 98 F | SYSTOLIC BLOOD PRESSURE: 136 MMHG | HEIGHT: 68 IN | OXYGEN SATURATION: 98 %

## 2019-07-01 DIAGNOSIS — E66.3 OVERWEIGHT (BMI 25.0-29.9): ICD-10-CM

## 2019-07-01 DIAGNOSIS — F90.2 ATTENTION DEFICIT HYPERACTIVITY DISORDER, COMBINED TYPE: Chronic | ICD-10-CM

## 2019-07-01 DIAGNOSIS — I10 BENIGN ESSENTIAL HYPERTENSION: Primary | Chronic | ICD-10-CM

## 2019-07-01 PROCEDURE — 99214 OFFICE O/P EST MOD 30 MIN: CPT | Mod: S$GLB,,, | Performed by: FAMILY MEDICINE

## 2019-07-01 PROCEDURE — 99999 PR PBB SHADOW E&M-EST. PATIENT-LVL III: ICD-10-PCS | Mod: PBBFAC,,, | Performed by: FAMILY MEDICINE

## 2019-07-01 PROCEDURE — 99999 PR PBB SHADOW E&M-EST. PATIENT-LVL III: CPT | Mod: PBBFAC,,, | Performed by: FAMILY MEDICINE

## 2019-07-01 PROCEDURE — 3008F BODY MASS INDEX DOCD: CPT | Mod: CPTII,S$GLB,, | Performed by: FAMILY MEDICINE

## 2019-07-01 PROCEDURE — 3008F PR BODY MASS INDEX (BMI) DOCUMENTED: ICD-10-PCS | Mod: CPTII,S$GLB,, | Performed by: FAMILY MEDICINE

## 2019-07-01 PROCEDURE — 99214 PR OFFICE/OUTPT VISIT, EST, LEVL IV, 30-39 MIN: ICD-10-PCS | Mod: S$GLB,,, | Performed by: FAMILY MEDICINE

## 2019-07-01 RX ORDER — DEXTROAMPHETAMINE SACCHARATE, AMPHETAMINE ASPARTATE, DEXTROAMPHETAMINE SULFATE AND AMPHETAMINE SULFATE 5; 5; 5; 5 MG/1; MG/1; MG/1; MG/1
1 TABLET ORAL
Qty: 90 TABLET | Refills: 0 | Status: SHIPPED | OUTPATIENT
Start: 2019-07-01 | End: 2019-07-29 | Stop reason: SDUPTHER

## 2019-07-01 RX ORDER — CHLORTHALIDONE 25 MG/1
25 TABLET ORAL DAILY
Qty: 30 TABLET | Refills: 1 | Status: SHIPPED | OUTPATIENT
Start: 2019-07-01 | End: 2019-07-29 | Stop reason: SDUPTHER

## 2019-07-01 NOTE — ASSESSMENT & PLAN NOTE
He reports that his current treatment is providing satisfactory relief of his attention deficit disorder symptoms and helping him compensate for his deficits at work. He reports that he is tolerating his current treatment well. He reports no mood instability, tics, or disordered sleep, or other apparent adverse effects. He is demonstrating no behaviors to suggest inappropriate use of prescribed medications. [place] Board of Pharmacy Controlled Prescription Drug Monitoring database was queried and showed no activity to suggest abuse, diversion, or other inappropriate use of prescription medications.

## 2019-07-01 NOTE — ASSESSMENT & PLAN NOTE
Wt Readings from Last 5 Encounters:   07/01/19 82.9 kg (182 lb 12.2 oz)   12/27/18 86.1 kg (189 lb 13.1 oz)   06/11/18 80.5 kg (177 lb 7.5 oz)   03/19/18 79.8 kg (175 lb 14.8 oz)   12/27/17 78 kg (171 lb 15.3 oz)     Body mass index is 27.79 kg/m².   Improved.

## 2019-07-01 NOTE — ASSESSMENT & PLAN NOTE
He has had mildly elevated blood pressure on multiple prior office visits, most of the time improving to an acceptable degree after resting.  However, he persists with stage I hypertension today.  He is working on therapeutic lifestyle changes.  We discussed treatment options.  It was agreed to begin treatment with chlorthalidone, he would enroll in digital medicine hypertension program, and he would return in about three weeks to remeasure blood pressure and check basic metabolic panel.

## 2019-07-01 NOTE — PROGRESS NOTES
CHIEF COMPLAINT  Medication Refill      HISTORY, ASSESSMENT, and PLAN    1. Benign essential hypertension    2. Attention deficit hyperactivity disorder, combined type    3. Overweight (BMI 25.0-29.9)      Orders Placed This Encounter    Potassium    chlorthalidone (HYGROTEN) 25 MG Tab    dextroamphetamine-amphetamine (ADDERALL) 20 mg tablet       Problem List Items Addressed This Visit        Psychiatric    Attention deficit hyperactivity disorder, combined type (Chronic)    Current Assessment & Plan     He reports that his current treatment is providing satisfactory relief of his attention deficit disorder symptoms and helping him compensate for his deficits at work. He reports that he is tolerating his current treatment well. He reports no mood instability, tics, or disordered sleep, or other apparent adverse effects. He is demonstrating no behaviors to suggest inappropriate use of prescribed medications. [place] Board of Pharmacy Controlled Prescription Drug Monitoring database was queried and showed no activity to suggest abuse, diversion, or other inappropriate use of prescription medications.           Relevant Medications    dextroamphetamine-amphetamine (ADDERALL) 20 mg tablet       Cardiac/Vascular    Benign essential hypertension - Primary (Chronic)    Current Assessment & Plan     He has had mildly elevated blood pressure on multiple prior office visits, most of the time improving to an acceptable degree after resting.  However, he persists with stage I hypertension today.  He is working on therapeutic lifestyle changes.  We discussed treatment options.  It was agreed to begin treatment with chlorthalidone, he would enroll in digital medicine hypertension program, and he would return in about three weeks to remeasure blood pressure and check basic metabolic panel.         Relevant Medications    chlorthalidone (HYGROTEN) 25 MG Tab    Other Relevant Orders    Potassium       Endocrine    Overweight  (BMI 25.0-29.9)    Current Assessment & Plan     Wt Readings from Last 5 Encounters:   07/01/19 82.9 kg (182 lb 12.2 oz)   12/27/18 86.1 kg (189 lb 13.1 oz)   06/11/18 80.5 kg (177 lb 7.5 oz)   03/19/18 79.8 kg (175 lb 14.8 oz)   12/27/17 78 kg (171 lb 15.3 oz)     Body mass index is 27.79 kg/m².   Improved.                Outpatient Medications Prior to Visit   Medication Sig Dispense Refill    dextroamphetamine-amphetamine (ADDERALL) 20 mg tablet Take 1 tablet by mouth every 4 to 6 hours as needed (for ADD - MAX 3 TABLET PER DAY). 90 tablet 0    dextroamphetamine-amphetamine (ADDERALL) 20 mg tablet Take 1 tablet by mouth every 4 to 6 hours as needed (for ADD - MAX 3 TABLET PER DAY). 90 tablet 0    dextroamphetamine-amphetamine (ADDERALL) 20 mg tablet Take 1 tablet by mouth every 4 to 6 hours as needed (for ADD - MAX 3 TABLET PER DAY). - APPOINTMENT REQUIRED FOR MORE REFILLS 90 tablet 0    famotidine (PEPCID) 40 MG tablet Take 1 tablet (40 mg total) by mouth daily as needed for Heartburn. 90 tablet 3     No facility-administered medications prior to visit.         Medications Ordered This Encounter   Medications    chlorthalidone (HYGROTEN) 25 MG Tab     Sig: Take 1 tablet (25 mg total) by mouth once daily.     Dispense:  30 tablet     Refill:  1    dextroamphetamine-amphetamine (ADDERALL) 20 mg tablet     Sig: Take 1 tablet by mouth every 4 to 6 hours as needed (for ADD - MAX 3 TABLET PER DAY). - APPOINTMENT REQUIRED FOR MORE REFILLS     Dispense:  90 tablet     Refill:  0     This prescription may be filled in a timely manner after completion of prior dispensed supply of this medication.       Medications Discontinued During This Encounter   Medication Reason    dextroamphetamine-amphetamine (ADDERALL) 20 mg tablet Reorder    dextroamphetamine-amphetamine (ADDERALL) 20 mg tablet Reorder    dextroamphetamine-amphetamine (ADDERALL) 20 mg tablet Reorder        Follow up in about 3 weeks (around 7/22/2019)  "for re-evaluate problem(s) discussed today.    REVIEW OF SYSTEMS  CARDIOVASCULAR: No angina or orthopnea reported.   ENDOCRINE: No polyuria or polydipsia reported.   PSYCHIATRIC: No mood instability reported.     PHYSICAL EXAM  Vitals:    07/01/19 0856   BP: (!) 136/98   Pulse: 99   Temp: 98.4 °F (36.9 °C)   TempSrc: Tympanic   SpO2: 98%   Weight: 82.9 kg (182 lb 12.2 oz)   Height: 5' 8" (1.727 m)     CONSTITUTIONAL: Vital signs noted. No apparent distress. Does not appear acutely ill or septic. Appears adequately hydrated.  HEENT: External ENT grossly unremarkable. Hearing grossly intact. Oropharynx moist.  PULM: Lungs clear. Breathing unlabored.  HEART: Auscultation reveals regular rate and rhythm without murmur, gallop or rub.  DERM: Skin warm and moist with normal turgor.  PSYCHIATRIC: Alert and conversant. Mood grossly neutral. Judgment and insight not grossly compromised.     Documentation entered by me for this encounter may have been done in part using speech-recognition technology. Although I have made an effort to ensure accuracy, "sound like" errors may exist and should be interpreted in context. -JOHANNE Retana MD.     "

## 2019-07-29 ENCOUNTER — PATIENT MESSAGE (OUTPATIENT)
Dept: ADMINISTRATIVE | Facility: OTHER | Age: 24
End: 2019-07-29

## 2019-07-29 ENCOUNTER — OFFICE VISIT (OUTPATIENT)
Dept: INTERNAL MEDICINE | Facility: CLINIC | Age: 24
End: 2019-07-29
Payer: COMMERCIAL

## 2019-07-29 ENCOUNTER — CLINICAL SUPPORT (OUTPATIENT)
Dept: CARDIOLOGY | Facility: CLINIC | Age: 24
End: 2019-07-29
Payer: COMMERCIAL

## 2019-07-29 ENCOUNTER — LAB VISIT (OUTPATIENT)
Dept: LAB | Facility: HOSPITAL | Age: 24
End: 2019-07-29
Attending: FAMILY MEDICINE
Payer: COMMERCIAL

## 2019-07-29 VITALS
TEMPERATURE: 98 F | WEIGHT: 181.69 LBS | BODY MASS INDEX: 27.54 KG/M2 | SYSTOLIC BLOOD PRESSURE: 132 MMHG | HEART RATE: 106 BPM | HEIGHT: 68 IN | DIASTOLIC BLOOD PRESSURE: 88 MMHG | OXYGEN SATURATION: 98 %

## 2019-07-29 DIAGNOSIS — K21.9 GASTROESOPHAGEAL REFLUX DISEASE WITHOUT ESOPHAGITIS: Chronic | ICD-10-CM

## 2019-07-29 DIAGNOSIS — I10 BENIGN ESSENTIAL HYPERTENSION: Chronic | ICD-10-CM

## 2019-07-29 DIAGNOSIS — I10 BENIGN ESSENTIAL HYPERTENSION: Primary | Chronic | ICD-10-CM

## 2019-07-29 DIAGNOSIS — F90.2 ATTENTION DEFICIT HYPERACTIVITY DISORDER, COMBINED TYPE: Chronic | ICD-10-CM

## 2019-07-29 DIAGNOSIS — R00.0 TACHYCARDIA: ICD-10-CM

## 2019-07-29 LAB — POTASSIUM SERPL-SCNC: 3.9 MMOL/L (ref 3.5–5.1)

## 2019-07-29 PROCEDURE — 3079F PR MOST RECENT DIASTOLIC BLOOD PRESSURE 80-89 MM HG: ICD-10-PCS | Mod: CPTII,S$GLB,, | Performed by: FAMILY MEDICINE

## 2019-07-29 PROCEDURE — 93005 EKG 12-LEAD: ICD-10-PCS | Mod: S$GLB,,, | Performed by: FAMILY MEDICINE

## 2019-07-29 PROCEDURE — 93010 EKG 12-LEAD: ICD-10-PCS | Mod: S$GLB,,, | Performed by: NUCLEAR MEDICINE

## 2019-07-29 PROCEDURE — 3008F BODY MASS INDEX DOCD: CPT | Mod: CPTII,S$GLB,, | Performed by: FAMILY MEDICINE

## 2019-07-29 PROCEDURE — 3075F PR MOST RECENT SYSTOLIC BLOOD PRESS GE 130-139MM HG: ICD-10-PCS | Mod: CPTII,S$GLB,, | Performed by: FAMILY MEDICINE

## 2019-07-29 PROCEDURE — 99999 PR PBB SHADOW E&M-EST. PATIENT-LVL IV: ICD-10-PCS | Mod: PBBFAC,,, | Performed by: FAMILY MEDICINE

## 2019-07-29 PROCEDURE — 99214 OFFICE O/P EST MOD 30 MIN: CPT | Mod: S$GLB,,, | Performed by: FAMILY MEDICINE

## 2019-07-29 PROCEDURE — 3079F DIAST BP 80-89 MM HG: CPT | Mod: CPTII,S$GLB,, | Performed by: FAMILY MEDICINE

## 2019-07-29 PROCEDURE — 3075F SYST BP GE 130 - 139MM HG: CPT | Mod: CPTII,S$GLB,, | Performed by: FAMILY MEDICINE

## 2019-07-29 PROCEDURE — 99999 PR PBB SHADOW E&M-EST. PATIENT-LVL IV: CPT | Mod: PBBFAC,,, | Performed by: FAMILY MEDICINE

## 2019-07-29 PROCEDURE — 99214 PR OFFICE/OUTPT VISIT, EST, LEVL IV, 30-39 MIN: ICD-10-PCS | Mod: S$GLB,,, | Performed by: FAMILY MEDICINE

## 2019-07-29 PROCEDURE — 93005 ELECTROCARDIOGRAM TRACING: CPT | Mod: S$GLB,,, | Performed by: FAMILY MEDICINE

## 2019-07-29 PROCEDURE — 84132 ASSAY OF SERUM POTASSIUM: CPT

## 2019-07-29 PROCEDURE — 93010 ELECTROCARDIOGRAM REPORT: CPT | Mod: S$GLB,,, | Performed by: NUCLEAR MEDICINE

## 2019-07-29 PROCEDURE — 3008F PR BODY MASS INDEX (BMI) DOCUMENTED: ICD-10-PCS | Mod: CPTII,S$GLB,, | Performed by: FAMILY MEDICINE

## 2019-07-29 RX ORDER — CHLORTHALIDONE 25 MG/1
25 TABLET ORAL DAILY
Qty: 90 TABLET | Refills: 1 | Status: SHIPPED | OUTPATIENT
Start: 2019-07-29 | End: 2020-07-28

## 2019-07-29 RX ORDER — DEXTROAMPHETAMINE SACCHARATE, AMPHETAMINE ASPARTATE, DEXTROAMPHETAMINE SULFATE AND AMPHETAMINE SULFATE 5; 5; 5; 5 MG/1; MG/1; MG/1; MG/1
1 TABLET ORAL
Qty: 90 TABLET | Refills: 0 | Status: SHIPPED | OUTPATIENT
Start: 2019-09-29

## 2019-07-29 RX ORDER — DEXTROAMPHETAMINE SACCHARATE, AMPHETAMINE ASPARTATE, DEXTROAMPHETAMINE SULFATE AND AMPHETAMINE SULFATE 5; 5; 5; 5 MG/1; MG/1; MG/1; MG/1
1 TABLET ORAL
Qty: 90 TABLET | Refills: 0 | Status: SHIPPED | OUTPATIENT
Start: 2019-07-29

## 2019-07-29 RX ORDER — DEXTROAMPHETAMINE SACCHARATE, AMPHETAMINE ASPARTATE, DEXTROAMPHETAMINE SULFATE AND AMPHETAMINE SULFATE 5; 5; 5; 5 MG/1; MG/1; MG/1; MG/1
1 TABLET ORAL
Qty: 90 TABLET | Refills: 0 | Status: SHIPPED | OUTPATIENT
Start: 2019-08-29

## 2019-07-29 NOTE — PROGRESS NOTES
CHIEF COMPLAINT  Follow-up (3 wk)      HISTORY, ASSESSMENT, and PLAN  1.  Benign essential hypertension        ASSESSMENT:  Controlled on chlorthalidone 25 mg daily.  He is having labs (including potassium level) checked today.    2.  Tachycardia        ASSESSMENT:  Mild, a symptoms.  He agreed to enroll in digital medicine hypertension program to by this home blood pressure readings.    3.  Attention deficit hyperactivity disorder, combined type        ASSESSMENT:  He reports satisfactory symptom trouble when he takes his Adderall, typically 2-3 times per day. He is demonstrating no behaviors to suggest inappropriate use of prescribed medications. [place] Board of Pharmacy Controlled Prescription Drug Monitoring database was queried and showed no activity to suggest abuse, diversion, or other inappropriate use of prescription medications.     4.  Gastroesophageal reflux disease without esophagitis       ASSESSMENT: Well controlled.      Orders Placed This Encounter    Basic metabolic panel    Hypertension Digital Medicine (HDMP) Enrollment Order    SCHEDULED EKG 12-LEAD (to Muse)    dextroamphetamine-amphetamine (ADDERALL) 20 mg tablet    dextroamphetamine-amphetamine (ADDERALL) 20 mg tablet    dextroamphetamine-amphetamine (ADDERALL) 20 mg tablet    chlorthalidone (HYGROTEN) 25 MG Tab    Hypertension Digital Medicine (HDMP): Assign Onboarding Questionnaires       Problem List Items Addressed This Visit        Psychiatric    Attention deficit hyperactivity disorder, combined type (Chronic)    Relevant Medications    dextroamphetamine-amphetamine (ADDERALL) 20 mg tablet    dextroamphetamine-amphetamine (ADDERALL) 20 mg tablet (Start on 8/29/2019)    dextroamphetamine-amphetamine (ADDERALL) 20 mg tablet (Start on 9/29/2019)       Cardiac/Vascular    Benign essential hypertension - Primary (Chronic)    Relevant Medications    chlorthalidone (HYGROTEN) 25 MG Tab    Other Relevant Orders    Basic metabolic  panel    SCHEDULED EKG 12-LEAD (to Muse)    Tachycardia       GI    Gastroesophageal reflux disease without esophagitis (Chronic)           Outpatient Medications Prior to Visit   Medication Sig Dispense Refill    chlorthalidone (HYGROTEN) 25 MG Tab Take 1 tablet (25 mg total) by mouth once daily. 30 tablet 1    dextroamphetamine-amphetamine (ADDERALL) 20 mg tablet Take 1 tablet by mouth every 4 to 6 hours as needed (for ADD - MAX 3 TABLET PER DAY). - APPOINTMENT REQUIRED FOR MORE REFILLS 90 tablet 0    famotidine (PEPCID) 40 MG tablet Take 1 tablet (40 mg total) by mouth daily as needed for Heartburn. 90 tablet 3     No facility-administered medications prior to visit.         Medications Ordered This Encounter   Medications    chlorthalidone (HYGROTEN) 25 MG Tab     Sig: Take 1 tablet (25 mg total) by mouth once daily.     Dispense:  90 tablet     Refill:  1    dextroamphetamine-amphetamine (ADDERALL) 20 mg tablet     Sig: Take 1 tablet by mouth every 4 to 6 hours as needed (for ADD - MAX 3 TABLET PER DAY).     Dispense:  90 tablet     Refill:  0     This prescription may be filled in a timely manner after completion of prior dispensed supply of this medication.    dextroamphetamine-amphetamine (ADDERALL) 20 mg tablet     Sig: Take 1 tablet by mouth every 4 to 6 hours as needed (for ADD - MAX 3 TABLET PER DAY).     Dispense:  90 tablet     Refill:  0     This prescription may be filled in a timely manner after completion of prior dispensed supply of this medication.    dextroamphetamine-amphetamine (ADDERALL) 20 mg tablet     Sig: Take 1 tablet by mouth every 4 to 6 hours as needed (for ADD - MAX 3 TABLET PER DAY).     Dispense:  90 tablet     Refill:  0     This prescription may be filled in a timely manner after completion of prior dispensed supply of this medication.       Medications Discontinued During This Encounter   Medication Reason    dextroamphetamine-amphetamine (ADDERALL) 20 mg tablet Reorder  "   chlorthalidone (HYGROTEN) 25 MG Tab Reorder        Follow up in about 3 months (around 10/29/2019) for re-evaluate problem(s) discussed today.    REVIEW OF SYSTEMS  Answers for HPI/ROS submitted by the patient on 7/29/2019   activity change: No  eye discharge: No  wheezing: No  chest pain: No  palpitations: No  constipation: No  vomiting: No  diarrhea: No  polydipsia: No  polyuria: No  difficulty urinating: No  hematuria: No  headaches: No  dysphoric mood: No      PHYSICAL EXAM  Vitals:    07/29/19 0831   BP: 132/88   BP Location: Right arm   Patient Position: Sitting   BP Method: Large (Manual)   Pulse: 106   Temp: 97.5 °F (36.4 °C)   TempSrc: Tympanic   SpO2: 98%   Weight: 82.4 kg (181 lb 10.5 oz)   Height: 5' 8" (1.727 m)     CONSTITUTIONAL: Vital signs noted. No apparent distress. Does not appear acutely ill or septic. Appears adequately hydrated.  HEENT: External ENT grossly unremarkable. Hearing grossly intact. Oropharynx moist.  PULM: Lungs clear. Breathing unlabored.  HEART: Auscultation reveals regular rate and rhythm without murmur, gallop or rub.  ABDOMEN: Soft and nontender. Bowel sounds present.  DERM: Skin warm and moist with normal turgor.  NEURO: There are no gross focal motor deficits or gross deficits of cranial nerves III-XII.  PSYCHIATRIC: Alert and conversant. Mood grossly neutral. Judgment and insight not grossly compromised.      Documentation entered by me for this encounter may have been done in part using speech-recognition technology. Although I have made an effort to ensure accuracy, "sound like" errors may exist and should be interpreted in context. -JOHANNE Retana MD.   "

## 2019-07-29 NOTE — PATIENT INSTRUCTIONS
Telemedicine Virtual Visits    What is a Virtual Visit?  A virtual visit is a secure video appointment with your provider via your smartphone or tablet.  This allows you to conduct a traditional office visit with your provider electronically through your [employer] aranza without leaving home or work.    How much is a Virtual Visit?  If you have health insurance, your insurance will be billed for the virtual visit. If your insurance does not cover a virtual visit (or if you do not have health insurance), you will be responsible for a $54 fee. If your insurance covers the virtual visit, you will be responsible for your co-pay, like when you come in for an office visit. (More and more insurances are covering virtual visits. If you have questions about your insurance coverage for virtual visits, you will need to contact your health insurance provider.)    Preparing for your Virtual Visit  · You must have a mobile phone or tablet with iOS or Android operating system.  · Your device must have a front-facing camera.  · You must have the [employer] aranza installed and [facility] selected as your healthcare provider.  · You can find the [employer] aranza in the Aranza Store (Kymeta) and Google Play Store ("[employer], Mobile Secretary").  · If you need help with [employer], help is available:  · online at [email or web address]   at the O[facility] in the 1st floor lobby of [facility]  · or by phone at [phone]    E-Pre-Check  · Once your Virtual Visit is scheduled you will need to complete the ePre-Check in the [employer] aranza before your visit.   · During ePre-Check you will verify your insurance, demographics and sign the Telehealth Consent form.        How to Start Your Virtual Visit  Once your ePre-check is complete, you will need to test your hardware prior to your scheduled appointment.    1. Select the box that has your upcoming Virtual Visit appointment.  2.  On the next screen select the 'Test Video' button on the bottom of  "the screen.  3.  If successful, you will receive a pop-up saying "You're all set."          Ready for your Virtual Visit Appointment   · Select 'Appointments' from the [employer] home screen.  · Find and select today's Virtual Visit Appointment.  · Select 'Begin Visit'. Once selected you will enter a virtual visit waiting room until your provider arrives.       "

## 2019-07-30 ENCOUNTER — PATIENT OUTREACH (OUTPATIENT)
Dept: OTHER | Facility: OTHER | Age: 24
End: 2019-07-30

## 2019-07-30 NOTE — PROGRESS NOTES
Digital Medicine Enrollment Call    Introduced Mr. Shayne Esquivel to Digital Medicine.     Discussed program expectations and requirements.    Introduced digital medicine care team.     Reviewed the importance of self-monitoring for digital medicine participation.     Reviewed that the Digital Medicine team is not available for emergencies and instructed the patient to call 911 or [facility] ([phone] or [phone]) if one arises.          Last 5 Patient Entered Readings                                      Current 30 Day Average: 145/101     Recent Readings 7/29/2019    SBP (mmHg) 145    DBP (mmHg) 101    Pulse 85

## 2019-07-30 NOTE — LETTER
July 31, 2019     Shayne Esquivel  [address]  [address]  [place] [address]       Dear Shayne,    Welcome to [facility] [place]! Our goal is to make care effective, proactive and convenient by using data you send us from home to better treat your chronic conditions.          My name is Nita Crane, and I am your dedicated Digital Medicine clinician. As an expert in medication management, I will help ensure that the medications you are taking continue to provide the intended benefits and help you reach your goals. You can reach me directly at [phone] or by sending me a message directly through your [facility] account.      I am Dora Reese and I will be your health . My job is to help you identify lifestyle changes to improve your disease control. We will talk about nutrition, exercise, and other ways you may be able to adjust your current habits to better your health. Additionally, we will help ensure you are completing the tests and screenings that are necessary to help manage your conditions. You can reach me directly at [phone] or by sending me a message directly through your [facility] account.    Most importantly, YOU are at the center of this team. Together, we will work to improve your overall health and encourage you to meet your goals for a healthier lifestyle.     What we expect from YOU:  · Please take frequent home blood pressure measurements. We ask that you take at least 1 blood pressure reading per week, but more information will better help us get you know you. Be sure you rest for a few minutes before taking the reading in a quiet, comfortable place.     Be available to receive phone calls or [facility] messages, when appropriate, from your care team. Please let us know if there are any specific days or times that work best for us to reach you via phone.     Complete routine tests and screenings. Dont worry, we will help keep you on track!           What you  should expect from your Digital Medicine Care Team:   We will work with you to create a personalized plan of care and provide you with encouragement and education, including regarding lifestyle changes, that could help you manage your disease states.     We will adjust your current medications, if needed, and continue to monitor your long-term progress.     We will provide you and your physician with monthly progress reports after you have been in the program for more than 30 days.     We will send you reminders through [facility] and text messages to help ensure you do not miss any testing deadlines to help manage your disease states.    You will be able to reach us by phone or through your [facility] account by clicking our names under Care Team on the right side of the home screen.    I look forward to working with you to achieve your blood pressure goals!    We look forward to working with you to help manage your health,    Sincerely,    Your Digital Medicine Team    Please visit our websites to learn more:   · Hypertension: [email or web address]      Remember, we are not available for emergencies. If you have an emergency, please contact your doctors office directly or call [facility] on-call ([phone] or [phone]) or [phone].

## 2019-07-31 ENCOUNTER — PATIENT OUTREACH (OUTPATIENT)
Dept: OTHER | Facility: OTHER | Age: 24
End: 2019-07-31

## 2019-07-31 NOTE — PROGRESS NOTES
HPI:  Mr. Bella is a 23 year old male with a PMH that includes:  Past Medical History:   Diagnosis Date    ADHD     Benign essential hypertension 12/27/2018    Gastroesophageal reflux disease without esophagitis 3/19/2018       Called patient to welcome into HTN DMP. Patient endorses adherence to medication regimen. Patient denies hypotensive s/sx (lightheadedness, dizziness, nausea, fatigue); patient denies hypertensive s/sx (SOB, CP, severe headaches, changes in vision). Instructed patient to seek medical care if BP > 180/110 and is accompanied by hypertensive s/sx associated, patient confirms understanding. Patient states that his BP reading on 07/29 was done at the O-bar while learning about how to use the BP cuff. He states that he is tolerating chlorthalidone very well.     Last 5 Patient Entered Readings                                      Current 30 Day Average: 145/101     Recent Readings 7/29/2019    SBP (mmHg) 145    DBP (mmHg) 101    Pulse 85          Assessment:  Reviewed recent readings. Per 2017 ACC/ AHA HTN guidelines (goal of BP < 130/80), current 30-day average need to be addressed more throroughly today. However, self-measure BP measurement was a test. BP in clinic appointment with PCP is mildly elevated above goal.      Plan:  Continue current medication regimen.   Educated patient on proper BP measurement techniques.  I will continue to monitor regularly and will follow-up in 2 to 3 weeks, sooner if blood pressure begins to trend upward or downward.     Current medication regimen:  Hypertension Medications             chlorthalidone (HYGROTEN) 25 MG Tab Take 1 tablet (25 mg total) by mouth once daily.          Patient denies having questions or concerns. Patient has my contact information and knows to call with any concerns or clinical changes.

## 2019-07-31 NOTE — PROGRESS NOTES
"Shayne Guillermo.      I'm happy to report that these test results are NORMAL or at least ACCEPTABLE.    Want to learn more about your test results and what they mean? It's as simple as 1, 2, 3.     (1) Log in to your [facility] account at [email or web address]     (2) From the "View test results" tab, click on the test you want to know more about.     (3) Click on the "About This Test" link.    We can discuss your test results further at your next appointment.  If you have any questions, be sure to write them down and bring them to your appointment.  If you have any urgent questions in the meantime, please send me a message using [facility], or you can call my office at [phone].    Thanks for letting me care for you, thanks for trusting us with your healthcare needs, and thanks for using [facility].    Sincerely,    JOHANNE Retana MD"

## 2019-07-31 NOTE — PROGRESS NOTES
"Shayne Guillermo.      I'm happy to report that these test results are NORMAL or at least ACCEPTABLE.    Want to learn more about your test results and what they mean? It's as simple as 1, 2, 3.     (1) Log in to your [facility] account at [email or web address]     (2) From the "View test results" tab, click on the test you want to know more about.     (3) Click on the "About This Test" link.    We can discuss your test results further at your next appointment.  If you have any questions, be sure to write them down and bring them to your appointment.  If you have any urgent questions in the meantime, please send me a message using [facility], or you can call my office at [phone].    Thanks for letting me care for you, thanks for trusting us with your healthcare needs, and thanks for using [facility].    Sincerely,    JOHANNE Retana MD"

## 2019-08-01 ENCOUNTER — PATIENT OUTREACH (OUTPATIENT)
Dept: OTHER | Facility: OTHER | Age: 24
End: 2019-08-01

## 2019-08-27 ENCOUNTER — PATIENT OUTREACH (OUTPATIENT)
Dept: OTHER | Facility: OTHER | Age: 24
End: 2019-08-27

## 2019-08-27 NOTE — PROGRESS NOTES
Last 5 Patient Entered Readings                                      Current 30 Day Average: 145/101     Recent Readings 7/29/2019    SBP (mmHg) 145    DBP (mmHg) 101    Pulse 85        Called Mr. Bella about follow up and lack of readings. Patient reports that he's switched doctors since he's no longer in [place]. He asked to be removed from the program at this time. Informed patient that he can always re-enroll if he comes back to [facility].

## 2019-08-27 NOTE — LETTER
November 4, 2019     Shayne Esquivel  [address]  [address]  [place] [address]       Dear Shayne,    Thank you for enrolling in [facility]s Digital Medicine Program. To participate, we ask that you submit information at least once weekly through your [facility] account and maintain regular contact with your Care Team. We have not received any data or heard from you in some time.     The Digital Medicine Care Team has attempted to reach you on multiple occasions to determine if you would like to continue participating in the program. While we encourage you to continue participating fully, we understand that circumstances may change.     To continue participating in the program, please contact me at [phone]. If we do not hear back, you will be un-enrolled, and your physician will be notified of your decision.    If you have submitted data and believe you are receiving this letter in error, please call the Digital Medicine Patient Support Line at [phone] for troubleshooting.      We look forward to hearing from you soon.    Sincerely,     Dora Tejadae  [facility]

## 2019-09-10 ENCOUNTER — PATIENT OUTREACH (OUTPATIENT)
Dept: OTHER | Facility: OTHER | Age: 24
End: 2019-09-10

## 2019-09-10 NOTE — PROGRESS NOTES
09/09/19 2:53 PM -  Patient prefers to call back at a later time  09/26/19 11:40 AM - Patient hung up the phone after introduction

## 2020-08-14 ENCOUNTER — PATIENT OUTREACH (OUTPATIENT)
Dept: ADMINISTRATIVE | Facility: HOSPITAL | Age: 25
End: 2020-08-14

## 2020-08-14 NOTE — PROGRESS NOTES
Pt on [facility] report for not seeing PCP in > 12mths. Spoke with patient. He now lives in [place] and seeing Nathaniel Gonzales NP in [facility]. Advised pt to call insurance to update. Pt voiced understanding.